# Patient Record
Sex: FEMALE | Race: BLACK OR AFRICAN AMERICAN | Employment: UNEMPLOYED | URBAN - NONMETROPOLITAN AREA
[De-identification: names, ages, dates, MRNs, and addresses within clinical notes are randomized per-mention and may not be internally consistent; named-entity substitution may affect disease eponyms.]

---

## 2023-01-05 ENCOUNTER — OFFICE VISIT (OUTPATIENT)
Dept: INTERNAL MEDICINE CLINIC | Age: 53
End: 2023-01-05

## 2023-01-05 VITALS
OXYGEN SATURATION: 97 % | HEIGHT: 69 IN | BODY MASS INDEX: 36.29 KG/M2 | WEIGHT: 245 LBS | HEART RATE: 92 BPM | SYSTOLIC BLOOD PRESSURE: 136 MMHG | TEMPERATURE: 98.7 F | DIASTOLIC BLOOD PRESSURE: 82 MMHG

## 2023-01-05 DIAGNOSIS — E66.9 OBESITY (BMI 35.0-39.9 WITHOUT COMORBIDITY): ICD-10-CM

## 2023-01-05 DIAGNOSIS — I10 PRIMARY HYPERTENSION: ICD-10-CM

## 2023-01-05 DIAGNOSIS — R73.03 PRE-DIABETES: ICD-10-CM

## 2023-01-05 DIAGNOSIS — Z13.9 SCREENING DUE: ICD-10-CM

## 2023-01-05 DIAGNOSIS — R00.2 PALPITATIONS: Primary | ICD-10-CM

## 2023-01-05 DIAGNOSIS — R05.8 DRY COUGH: ICD-10-CM

## 2023-01-05 LAB — HBA1C MFR BLD: 6 % (ref 4.3–5.7)

## 2023-01-05 PROCEDURE — 3074F SYST BP LT 130 MM HG: CPT

## 2023-01-05 PROCEDURE — 3078F DIAST BP <80 MM HG: CPT

## 2023-01-05 PROCEDURE — 93000 ELECTROCARDIOGRAM COMPLETE: CPT

## 2023-01-05 PROCEDURE — 83036 HEMOGLOBIN GLYCOSYLATED A1C: CPT

## 2023-01-05 PROCEDURE — 99204 OFFICE O/P NEW MOD 45 MIN: CPT

## 2023-01-05 RX ORDER — CANDESARTAN 8 MG/1
8 TABLET ORAL DAILY
COMMUNITY
End: 2023-01-05 | Stop reason: SDUPTHER

## 2023-01-05 RX ORDER — CANDESARTAN 8 MG/1
8 TABLET ORAL DAILY
Qty: 30 TABLET | Refills: 0 | Status: SHIPPED | OUTPATIENT
Start: 2023-01-05 | End: 2023-02-04

## 2023-01-05 RX ORDER — AMLODIPINE BESYLATE 5 MG/1
5 TABLET ORAL DAILY
COMMUNITY
End: 2023-01-05 | Stop reason: SDUPTHER

## 2023-01-05 RX ORDER — AMLODIPINE BESYLATE 5 MG/1
5 TABLET ORAL DAILY
Qty: 30 TABLET | Refills: 0 | Status: SHIPPED | OUTPATIENT
Start: 2023-01-05

## 2023-01-05 RX ORDER — AMIODARONE HYDROCHLORIDE 200 MG/1
200 TABLET ORAL DAILY
Qty: 30 TABLET | Refills: 0 | Status: SHIPPED | OUTPATIENT
Start: 2023-01-05 | End: 2023-02-04

## 2023-01-05 RX ORDER — AMIODARONE HYDROCHLORIDE 200 MG/1
200 TABLET ORAL DAILY
COMMUNITY
End: 2023-01-05 | Stop reason: SDUPTHER

## 2023-01-05 RX ORDER — CLOPIDOGREL BISULFATE 75 MG/1
75 TABLET ORAL DAILY
COMMUNITY
End: 2023-01-05 | Stop reason: CLARIF

## 2023-01-05 SDOH — ECONOMIC STABILITY: FOOD INSECURITY: WITHIN THE PAST 12 MONTHS, YOU WORRIED THAT YOUR FOOD WOULD RUN OUT BEFORE YOU GOT MONEY TO BUY MORE.: SOMETIMES TRUE

## 2023-01-05 SDOH — ECONOMIC STABILITY: FOOD INSECURITY: WITHIN THE PAST 12 MONTHS, THE FOOD YOU BOUGHT JUST DIDN'T LAST AND YOU DIDN'T HAVE MONEY TO GET MORE.: SOMETIMES TRUE

## 2023-01-05 ASSESSMENT — ENCOUNTER SYMPTOMS
CONSTIPATION: 0
CHOKING: 0
ABDOMINAL PAIN: 0
CHEST TIGHTNESS: 0
SORE THROAT: 0
SINUS PAIN: 0
VOMITING: 0
APNEA: 0
COUGH: 1
NAUSEA: 0
DIARRHEA: 0
WHEEZING: 0
SHORTNESS OF BREATH: 0
STRIDOR: 0
VOICE CHANGE: 0
RHINORRHEA: 0
BLOOD IN STOOL: 0

## 2023-01-05 ASSESSMENT — PATIENT HEALTH QUESTIONNAIRE - PHQ9
1. LITTLE INTEREST OR PLEASURE IN DOING THINGS: 0
SUM OF ALL RESPONSES TO PHQ QUESTIONS 1-9: 0
SUM OF ALL RESPONSES TO PHQ9 QUESTIONS 1 & 2: 0
2. FEELING DOWN, DEPRESSED OR HOPELESS: 0
SUM OF ALL RESPONSES TO PHQ QUESTIONS 1-9: 0

## 2023-01-05 ASSESSMENT — SOCIAL DETERMINANTS OF HEALTH (SDOH)
HOW HARD IS IT FOR YOU TO PAY FOR THE VERY BASICS LIKE FOOD, HOUSING, MEDICAL CARE, AND HEATING?: SOMEWHAT HARD
HOW HARD IS IT FOR YOU TO PAY FOR THE VERY BASICS LIKE FOOD, HOUSING, MEDICAL CARE, AND HEATING?: SOMEWHAT HARD

## 2023-01-05 NOTE — ASSESSMENT & PLAN NOTE
Uncontrolled. Likely 2/2 dust in current place of living. Recommend optimizing living area and removing particles of dust. Can recommend HEPA Filter. Will review during next visit.

## 2023-01-05 NOTE — PROGRESS NOTES
[address] [facility] Internal Medicine   [facility] [address] [place] [address], [address] [facility]  Dept: [phone]  Dept Fax: [phone]    Gurvinder Rivera (1970) is a 46 y.o. female New patient, here for evaluation of the following chief complaint(s):  Chief Complaint   Patient presents with    Establish Care    Cough    Palpitations        ASSESSMENT AND PLAN     1. Palpitations  Assessment & Plan:  Uncontrolled. Started 1 year ago. Pt was placed on amiodarone 200 mg daily in addition to plavix 75 mg daily per physicians in [facility]. Unclear if patient has underlying arrythmia. EKG today demonstrated nsr and potential TWI in V1 and V2 but no prior EKG to compare.   -Cardiac event monitor for 14 days  -TTE to evaluate for potential arrhythmia cardiomyopathy or valvular abnormalities  -Continue amiodarone 200 mg daily for now however as this medication has potential for liver and lung disease, will need to consider cessation if a strong indication does not exist  -Discontinue plavix 75 mg daily; unclear indication. No h/o stroke / MI / endovascular procedure / PAD  -Will obtain further lab studies to evaluate for other causes:    +TSH + FT4    +CMP - also to evaluate for liver and renal disease    +CBC w/ auto-diff to evaluate for anemia / thrombocytopenia / leukopenia / etc    +Ionized calcium    +Magnesium    +Phos  Orders:  -     Comprehensive Metabolic Panel; Future  -     EKG 12 Lead - Clinic Performed  -     Cardiac event monitor; Future  -     Magnesium; Future  -     Calcium, Ionized; Future  -     Phosphorus; Future  -     amiodarone (CORDARONE) 200 MG tablet; Take 1 tablet by mouth daily, Disp-30 tablet, R-0Normal  -     Echocardiogram complete; Future  -     TSH; Future  -     T4, Free; Future  2. Pre-diabetes  Assessment & Plan:   Uncontrolled, lifestyle modifications recommended    3. Dry cough  Assessment & Plan:  Uncontrolled. Likely 2/2 dust in current place of living.  Recommend optimizing living area and removing particles of dust. Can recommend HEPA Filter. Will review during next visit. 4. Primary hypertension  Assessment & Plan:   Well-controlled, continue current medications. Takes candesartan 8 mg daily and amlodipine 5 mg daily. /82 mmHg today. Will continue anti-hypertensive regimen for now. Orders:  -     amLODIPine (NORVASC) 5 MG tablet; Take 1 tablet by mouth daily, Disp-30 tablet, R-0Normal  -     candesartan (ATACAND) 8 MG tablet; Take 1 tablet by mouth daily, Disp-30 tablet, R-0Normal  5. Obesity (BMI 35.0-39.9 without comorbidity)  Assessment & Plan:  Uncontrolled, lifestyle modifications recommended   Body mass index is 36.18 kg/m². -Recommend lifestyle changes  -Recommend healthy diet  -Recommend exercise  -Will consider dietician consult  6. Screening due  -     Comprehensive Metabolic Panel; Future  -     CBC with Auto Differential; Future  -     Lipid, Fasting; Future  -     POCT glycosylated hemoglobin (Hb A1C)  -     Magnesium; Future  -     Calcium, Ionized; Future  -     Phosphorus; Future    Return in about 2 weeks (around 2023) for Follow up on TTE / CMP / CBC / lipid panel. with Chavez Gonzalez MD     HPI     Chief Complaint   Patient presents with    Establish Care    Cough    Palpitations     HPI  Pt Maranda Coleman is a 46 y.o. female with no documented past medication history who presents for establishment of care. Denies h/o stroke / MI / any endovascular procedure. Pt reports she is currently monogamous. Denies any other issues. Remainder of ROS below. 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 Denies smoking, occasional social drinking of wine (once a year), and denies illicit substance use.      Family Hx:  -Mom is 80years old - HTN / osteoarthritis  -Dad passed when he was 72years old - he had HTN and  from this issue because he was non-compliant  -2 late brothers - HTN, both, passed away the same way his father   -2 wonderful sisters - no health issues    #Dry Cough: Active  Started 23. Dry cough, no sputum production. Pt reports 1-2 x /month. Pt attributes this to the dust in her new house. #\"Heart Palpitation\": Active  Started 1 year ago, stopped briefly, then started again. Pt was reportedly hospitalized 4 times - pt was asked to change her diet, exercise, eat fruits and vegetables, and stop eating chocolate. Denies h/o MI. Placed in [facility]. Takes amiodarone 200 mg daily for this, pt reports that heart palpitations have improved with this. Also takes plavix 75 mg daily for \"heart palpitations\" per physician in [facility]. Unclear indication. Does have accompanying headaches and occasional dizziness. #Pre-Diabetes A1c 6.0%: Active  Not on any glycemic control agents. Will evaluate further during next visit. #Well-controlled Primary HTN: Stable  BP: 136/82 mmHg. HR: 92 beats/min. Takes amlodipine 5 mg daily at home. Also takes candesartan 8 mg daily. #Obesity Class II (35.0-39.9 kg/m2): Stable  Body mass index is 36.18 kg/m². Plan:  -Recommend lifestyle changes  -Recommend healthy diet  -Recommend exercise  -Will consider inpatient dietician consult    Medications:    Current Outpatient Medications:     amLODIPine (NORVASC) 5 MG tablet, Take 1 tablet by mouth daily, Disp: 30 tablet, Rfl: 0    candesartan (ATACAND) 8 MG tablet, Take 1 tablet by mouth daily, Disp: 30 tablet, Rfl: 0    amiodarone (CORDARONE) 200 MG tablet, Take 1 tablet by mouth daily, Disp: 30 tablet, Rfl: 0    The patient is allergic to dust mite extract. Past Medical History:  Kristi Li  has a past medical history of Hypertension and Palpitations. Past Surgical History:  The patient  has a past surgical history that includes  section. Family History:   This patient's family history includes High Blood Pressure in her brother, father, mother, and sister. Social History:  Devon Larkin  reports that she has never smoked. She has never been exposed to tobacco smoke. She has never used smokeless tobacco. She reports current alcohol use of about 1.0 standard drink per week. She reports that she does not use drugs. Health Maintenance   Topic Date Due    HIV screen  Never done    Hepatitis C screen  Never done    DTaP/Tdap/Td vaccine (1 - Tdap) Never done    Cervical cancer screen  Never done    Lipids  Never done    Colorectal Cancer Screen  Never done    COVID-19 Vaccine (2 - Booster for Jessica series) 06/03/2020    Breast cancer screen  Never done    Shingles vaccine (1 of 2) Never done    Flu vaccine (1) Never done    A1C test (Diabetic or Prediabetic)  01/05/2024    Depression Screen  01/05/2024    Hepatitis A vaccine  Aged Out    Hib vaccine  Aged Out    Meningococcal (ACWY) vaccine  Aged Out    Pneumococcal 0-64 years Vaccine  Aged Out       ROS:  Review of Systems   Constitutional:  Negative for activity change, appetite change, chills, fatigue, fever and unexpected weight change. HENT:  Negative for postnasal drip, rhinorrhea, sinus pain, sore throat, tinnitus and voice change. Eyes:  Negative for visual disturbance. Respiratory:  Positive for cough (dry, started 01/03/23). Negative for apnea, choking, chest tightness, shortness of breath, wheezing and stridor. Cardiovascular:  Positive for palpitations. Negative for chest pain and leg swelling. Gastrointestinal:  Negative for abdominal pain, blood in stool, constipation, diarrhea, nausea and vomiting. Endocrine: Negative for polydipsia and polyuria. Genitourinary:  Negative for dysuria, flank pain and hematuria. Musculoskeletal:  Negative for arthralgias and myalgias. Skin:  Negative for rash and wound. Neurological:  Positive for dizziness (with palpitations) and headaches (with palpitations). Negative for tremors, seizures, weakness, light-headedness and numbness. Hematological:  Negative for adenopathy. Psychiatric/Behavioral:  Positive for confusion. Negative for hallucinations. The patient is not hyperactive. PHYSICAL EXAMINATION     Vitals:    01/05/23 1504   BP: 136/82   Pulse: 92   Temp: 98.7 °F (37.1 °C)   SpO2: 97%       Body mass index is 36.18 kg/m². Physical Exam  Vitals and nursing note reviewed. Constitutional:       General: She is awake. Appearance: Normal appearance. She is obese. She is not ill-appearing or diaphoretic. HENT:      Head: Normocephalic and atraumatic. Right Ear: External ear normal.      Left Ear: External ear normal.      Nose: Nose normal.      Mouth/Throat:      Mouth: Mucous membranes are moist.      Pharynx: Oropharynx is clear. No oropharyngeal exudate or posterior oropharyngeal erythema. Eyes:      General: Lids are normal. No scleral icterus. Extraocular Movements: Extraocular movements intact. Pupils: Pupils are equal, round, and reactive to light. Cardiovascular:      Rate and Rhythm: Normal rate and regular rhythm. Pulses: Normal pulses. Radial pulses are 2+ on the right side and 2+ on the left side. Posterior tibial pulses are 2+ on the right side and 2+ on the left side. Heart sounds: Normal heart sounds. No murmur heard. No friction rub. No gallop. Pulmonary:      Effort: Pulmonary effort is normal.      Breath sounds: Normal breath sounds. No wheezing, rhonchi or rales. Abdominal:      General: Bowel sounds are normal.      Palpations: Abdomen is soft. Tenderness: There is no abdominal tenderness. There is no guarding or rebound. Musculoskeletal:      Cervical back: Normal range of motion and neck supple. Right lower leg: No edema. Left lower leg: No edema. Skin:     General: Skin is warm and dry. Neurological:      General: No focal deficit present. Mental Status: She is alert and oriented to person, place, and time.  Mental status is at baseline. GCS: GCS eye subscore is 4. GCS verbal subscore is 5. GCS motor subscore is 6. Psychiatric:         Attention and Perception: Attention and perception normal.         Mood and Affect: Mood and affect normal.         Speech: Speech normal.         Behavior: Behavior normal. Behavior is cooperative. Thought Content: Thought content normal.         Cognition and Memory: Cognition and memory normal.         Judgment: Judgment normal.       MOST RECENT DIAGNOSTIC DATA     Labs Reviewed 1/5/2023:    Lab Results   Component Value Date    LABA1C 6.0 (H) 01/05/2023     Radiology  none    Other Studies  A1c: 6.0% 01/05/22  EKG: nsr. Assessment and plan created with review by Dr. Adrien Tobar. Farooq Worthington DO. All findings, labs and other data reviewed in conjunction with Dr. Adrien Tobar. Farooq Worthington DO. An electronic signature was used to authenticate this note.     Electronically signed by Yossi Wren MD on 1/5/2023 at 5:24 PM  Internal Medicine PGY-2  [facility], [address]

## 2023-01-05 NOTE — ASSESSMENT & PLAN NOTE
Uncontrolled, lifestyle modifications recommended   Body mass index is 36.18 kg/m².   -Recommend lifestyle changes  -Recommend healthy diet  -Recommend exercise  -Will consider dietician consult

## 2023-01-05 NOTE — ASSESSMENT & PLAN NOTE
Uncontrolled. Started 1 year ago. Pt was placed on amiodarone 200 mg daily in addition to plavix 75 mg daily per physicians in [facility]. Unclear if patient has underlying arrythmia. EKG today demonstrated nsr and potential TWI in V1 and V2 but no prior EKG to compare.   -Cardiac event monitor for 14 days  -TTE to evaluate for potential arrhythmia cardiomyopathy or valvular abnormalities  -Continue amiodarone 200 mg daily for now however as this medication has potential for liver and lung disease, will need to consider cessation if a strong indication does not exist  -Discontinue plavix 75 mg daily; unclear indication.  No h/o stroke / MI / endovascular procedure / PAD  -Will obtain further lab studies to evaluate for other causes:    +TSH + FT4    +CMP - also to evaluate for liver and renal disease    +CBC w/ auto-diff to evaluate for anemia / thrombocytopenia / leukopenia / etc    +Ionized calcium    +Magnesium    +Phos

## 2023-01-05 NOTE — ASSESSMENT & PLAN NOTE
Well-controlled, continue current medications. Takes candesartan 8 mg daily and amlodipine 5 mg daily. /82 mmHg today. Will continue anti-hypertensive regimen for now.

## 2023-01-09 ENCOUNTER — HOSPITAL ENCOUNTER (OUTPATIENT)
Age: 53
Discharge: HOME | End: 2023-01-09

## 2023-01-09 DIAGNOSIS — R00.2 PALPITATIONS: ICD-10-CM

## 2023-01-09 DIAGNOSIS — Z13.9 SCREENING DUE: ICD-10-CM

## 2023-01-09 LAB
ALBUMIN SERPL-MCNC: 4.1 G/DL (ref 3.5–5.1)
ALP BLD-CCNC: 91 U/L (ref 38–126)
ALT SERPL-CCNC: 19 U/L (ref 11–66)
ANION GAP SERPL CALCULATED.3IONS-SCNC: 11 MEQ/L (ref 8–16)
AST SERPL-CCNC: 20 U/L (ref 5–40)
AVERAGE GLUCOSE: 132 MG/DL (ref 70–126)
BASOPHILS # BLD: 0.5 %
BASOPHILS ABSOLUTE: 0 THOU/MM3 (ref 0–0.1)
BILIRUB SERPL-MCNC: 0.2 MG/DL (ref 0.3–1.2)
BUN BLDV-MCNC: 10 MG/DL (ref 7–22)
CALCIUM IONIZED: 1.17 MMOL/L (ref 1.12–1.32)
CALCIUM SERPL-MCNC: 9.4 MG/DL (ref 8.5–10.5)
CHLORIDE BLD-SCNC: 104 MEQ/L (ref 98–111)
CHOLESTEROL, FASTING: 285 MG/DL (ref 100–199)
CO2: 27 MEQ/L (ref 23–33)
CREAT SERPL-MCNC: 0.8 MG/DL (ref 0.4–1.2)
EOSINOPHIL # BLD: 2.7 %
EOSINOPHILS ABSOLUTE: 0.2 THOU/MM3 (ref 0–0.4)
ERYTHROCYTE [DISTWIDTH] IN BLOOD BY AUTOMATED COUNT: 15.4 % (ref 11.5–14.5)
ERYTHROCYTE [DISTWIDTH] IN BLOOD BY AUTOMATED COUNT: 47.1 FL (ref 35–45)
GFR SERPL CREATININE-BSD FRML MDRD: > 60 ML/MIN/1.73M2
GLUCOSE BLD-MCNC: 89 MG/DL (ref 70–108)
HBA1C MFR BLD: 6.4 % (ref 4.4–6.4)
HCT VFR BLD CALC: 40.1 % (ref 37–47)
HDLC SERPL-MCNC: 42 MG/DL
HEMOGLOBIN: 13 GM/DL (ref 12–16)
IMMATURE GRANS (ABS): 0.01 THOU/MM3 (ref 0–0.07)
IMMATURE GRANULOCYTES: 0.2 %
LDL CHOLESTEROL CALCULATED: 191 MG/DL
LYMPHOCYTES # BLD: 56.6 %
LYMPHOCYTES ABSOLUTE: 3.5 THOU/MM3 (ref 1–4.8)
MAGNESIUM: 2 MG/DL (ref 1.6–2.4)
MCH RBC QN AUTO: 27.5 PG (ref 26–33)
MCHC RBC AUTO-ENTMCNC: 32.4 GM/DL (ref 32.2–35.5)
MCV RBC AUTO: 84.8 FL (ref 81–99)
MONOCYTES # BLD: 8.8 %
MONOCYTES ABSOLUTE: 0.5 THOU/MM3 (ref 0.4–1.3)
NUCLEATED RED BLOOD CELLS: 0 /100 WBC
PHOSPHORUS: 3.3 MG/DL (ref 2.4–4.7)
PLATELET # BLD: 342 THOU/MM3 (ref 130–400)
PMV BLD AUTO: 8.9 FL (ref 9.4–12.4)
POTASSIUM SERPL-SCNC: 4.2 MEQ/L (ref 3.5–5.2)
RBC # BLD: 4.73 MILL/MM3 (ref 4.2–5.4)
SEG NEUTROPHILS: 31.2 %
SEGMENTED NEUTROPHILS ABSOLUTE COUNT: 1.9 THOU/MM3 (ref 1.8–7.7)
SODIUM BLD-SCNC: 142 MEQ/L (ref 135–145)
TOTAL PROTEIN: 7.3 G/DL (ref 6.1–8)
TRIGLYCERIDE, FASTING: 260 MG/DL (ref 0–199)
WBC # BLD: 6.2 THOU/MM3 (ref 4.8–10.8)

## 2023-01-09 PROCEDURE — 36415 COLL VENOUS BLD VENIPUNCTURE: CPT

## 2023-01-09 PROCEDURE — 85025 COMPLETE CBC W/AUTO DIFF WBC: CPT

## 2023-01-09 PROCEDURE — 84100 ASSAY OF PHOSPHORUS: CPT

## 2023-01-09 PROCEDURE — 82330 ASSAY OF CALCIUM: CPT

## 2023-01-09 PROCEDURE — 83735 ASSAY OF MAGNESIUM: CPT

## 2023-01-09 PROCEDURE — 83036 HEMOGLOBIN GLYCOSYLATED A1C: CPT

## 2023-01-09 PROCEDURE — 80053 COMPREHEN METABOLIC PANEL: CPT

## 2023-01-09 PROCEDURE — 80061 LIPID PANEL: CPT

## 2023-01-16 ENCOUNTER — HOSPITAL ENCOUNTER (OUTPATIENT)
Dept: NON INVASIVE DIAGNOSTICS | Age: 53
Discharge: HOME | End: 2023-01-16
Payer: COMMERCIAL

## 2023-01-16 DIAGNOSIS — R00.2 PALPITATIONS: ICD-10-CM

## 2023-01-16 LAB
LV EF: 45 %
LVEF MODALITY: NORMAL

## 2023-01-16 PROCEDURE — 93270 REMOTE 30 DAY ECG REV/REPORT: CPT

## 2023-01-16 PROCEDURE — 93306 TTE W/DOPPLER COMPLETE: CPT

## 2023-01-16 NOTE — PROCEDURES
The skin was prepped and a 14 day cardiac event monitor was applied. The patient was instructed on the documentation of symptoms and the purpose of the monitor as well as the things to avoid while wearing the monitor. The patient was instructed to remove and return the monitor on 01/30/2023.   The serial number of the monitor that was applied is [id number]

## 2023-01-18 PROBLEM — I50.22 HEART FAILURE WITH MID-RANGE EJECTION FRACTION (HCC): Status: ACTIVE | Noted: 2023-01-18

## 2023-01-18 PROBLEM — E78.2 MIXED DYSLIPIDEMIA: Status: ACTIVE | Noted: 2023-01-18

## 2023-01-18 PROBLEM — I50.20 HFREF (HEART FAILURE WITH REDUCED EJECTION FRACTION) (HCC): Status: ACTIVE | Noted: 2023-01-18

## 2023-01-18 PROBLEM — I50.20 HFREF (HEART FAILURE WITH REDUCED EJECTION FRACTION) (HCC): Status: RESOLVED | Noted: 2023-01-18 | Resolved: 2023-01-18

## 2023-01-19 PROBLEM — Z00.00 ROUTINE ADULT HEALTH MAINTENANCE: Status: ACTIVE | Noted: 2023-01-19

## 2023-01-24 ENCOUNTER — TELEPHONE (OUTPATIENT)
Dept: INTERNAL MEDICINE CLINIC | Age: 53
End: 2023-01-24

## 2023-01-24 NOTE — TELEPHONE ENCOUNTER
----- Message from Concepcion Conway sent at 1/24/2023 10:53 AM EST -----  Subject: Appointment Request    Reason for Call: Established Patient Appointment needed: Routine Existing   Condition Follow Up    QUESTIONS    Reason for appointment request? Requested Provider unavailable - Mychal Hawkins     Additional Information for Provider? Patient needs to r/s her appt from   1/19 for her 2 week f/u for heart.  She would prefer mornings if at all   possible.   ---------------------------------------------------------------------------  --------------  Trudy RODAS  [phone]; OK to leave message on voicemail  ---------------------------------------------------------------------------  --------------  SCRIPT ANSWERS  COVID Screen: Seb Wolf

## 2023-02-09 ENCOUNTER — OFFICE VISIT (OUTPATIENT)
Dept: INTERNAL MEDICINE CLINIC | Age: 53
End: 2023-02-09

## 2023-02-09 VITALS
SYSTOLIC BLOOD PRESSURE: 130 MMHG | HEIGHT: 69 IN | HEART RATE: 84 BPM | DIASTOLIC BLOOD PRESSURE: 80 MMHG | TEMPERATURE: 97.2 F | BODY MASS INDEX: 35.87 KG/M2 | WEIGHT: 242.2 LBS

## 2023-02-09 DIAGNOSIS — E78.2 MIXED DYSLIPIDEMIA: ICD-10-CM

## 2023-02-09 DIAGNOSIS — I50.22 HEART FAILURE WITH MID-RANGE EJECTION FRACTION (HCC): ICD-10-CM

## 2023-02-09 DIAGNOSIS — Z00.00 ROUTINE ADULT HEALTH MAINTENANCE: ICD-10-CM

## 2023-02-09 DIAGNOSIS — I10 PRIMARY HYPERTENSION: ICD-10-CM

## 2023-02-09 DIAGNOSIS — E66.9 OBESITY (BMI 35.0-39.9 WITHOUT COMORBIDITY): ICD-10-CM

## 2023-02-09 DIAGNOSIS — R00.2 PALPITATIONS: Primary | ICD-10-CM

## 2023-02-09 DIAGNOSIS — R73.03 PRE-DIABETES: ICD-10-CM

## 2023-02-09 PROCEDURE — 3074F SYST BP LT 130 MM HG: CPT

## 2023-02-09 PROCEDURE — 99214 OFFICE O/P EST MOD 30 MIN: CPT

## 2023-02-09 PROCEDURE — 3078F DIAST BP <80 MM HG: CPT

## 2023-02-09 RX ORDER — AMIODARONE HYDROCHLORIDE 200 MG/1
200 TABLET ORAL DAILY
Qty: 30 TABLET | Refills: 3 | Status: SHIPPED | OUTPATIENT
Start: 2023-02-09

## 2023-02-09 RX ORDER — AMIODARONE HYDROCHLORIDE 200 MG/1
200 TABLET ORAL DAILY
COMMUNITY
End: 2023-02-09 | Stop reason: SDUPTHER

## 2023-02-09 RX ORDER — SPIRONOLACTONE 50 MG/1
50 TABLET, FILM COATED ORAL DAILY
Qty: 90 TABLET | Refills: 1 | Status: SHIPPED | OUTPATIENT
Start: 2023-02-09

## 2023-02-09 RX ORDER — ATORVASTATIN CALCIUM 40 MG/1
40 TABLET, FILM COATED ORAL DAILY
Qty: 30 TABLET | Refills: 0 | Status: SHIPPED | OUTPATIENT
Start: 2023-02-09

## 2023-02-09 RX ORDER — AMLODIPINE BESYLATE 5 MG/1
5 TABLET ORAL DAILY
Qty: 30 TABLET | Refills: 3 | Status: CANCELLED | OUTPATIENT
Start: 2023-02-09

## 2023-02-09 RX ORDER — ASPIRIN 81 MG/1
81 TABLET ORAL DAILY
Qty: 90 TABLET | Refills: 1 | Status: SHIPPED | OUTPATIENT
Start: 2023-02-09

## 2023-02-09 RX ORDER — CANDESARTAN 8 MG/1
8 TABLET ORAL DAILY
Qty: 30 TABLET | Refills: 3 | Status: SHIPPED | OUTPATIENT
Start: 2023-02-09

## 2023-02-09 RX ORDER — CANDESARTAN 8 MG/1
8 TABLET ORAL DAILY
COMMUNITY
End: 2023-02-09 | Stop reason: SDUPTHER

## 2023-02-09 RX ORDER — METOPROLOL SUCCINATE 25 MG/1
25 TABLET, EXTENDED RELEASE ORAL DAILY
Qty: 90 TABLET | Refills: 1 | Status: SHIPPED | OUTPATIENT
Start: 2023-02-09

## 2023-02-09 SDOH — ECONOMIC STABILITY: HOUSING INSECURITY
IN THE LAST 12 MONTHS, WAS THERE A TIME WHEN YOU DID NOT HAVE A STEADY PLACE TO SLEEP OR SLEPT IN A SHELTER (INCLUDING NOW)?: NO

## 2023-02-09 SDOH — ECONOMIC STABILITY: FOOD INSECURITY: WITHIN THE PAST 12 MONTHS, YOU WORRIED THAT YOUR FOOD WOULD RUN OUT BEFORE YOU GOT MONEY TO BUY MORE.: OFTEN TRUE

## 2023-02-09 SDOH — ECONOMIC STABILITY: INCOME INSECURITY: HOW HARD IS IT FOR YOU TO PAY FOR THE VERY BASICS LIKE FOOD, HOUSING, MEDICAL CARE, AND HEATING?: HARD

## 2023-02-09 SDOH — ECONOMIC STABILITY: FOOD INSECURITY: WITHIN THE PAST 12 MONTHS, THE FOOD YOU BOUGHT JUST DIDN'T LAST AND YOU DIDN'T HAVE MONEY TO GET MORE.: OFTEN TRUE

## 2023-02-09 NOTE — PROGRESS NOTES
[address] [facility] Internal Medicine   [facility] [address] [facility] [address] JESUS SMITH AM OFFENEGG II.EHSAN, [address] [place] [facility]  Dept: [phone]  Dept Fax: [phone]    Roge Starks (1970) is a 46 y.o. female Established patient, here for evaluation of the following chief complaint(s):  Chief Complaint   Patient presents with    Hypertension     1 month / f/u echo , holter & labs     Palpitations    Obesity    Other     Prediabetes- A1c 6.4 on 19/23        ASSESSMENT AND PLAN     1. Palpitations  Assessment & Plan:  Uncontrolled. Unclear etiology. Intermittent, sporadic, but persistently so for 1 year. Denies h/o MI or arrhythmias. Saw cardiologist in [facility] who started her on amiodarone after which palpitations improved. Unclear indication for this. Does have accompanied headaches and lightheadedness with palpitations. Workup thus far:  -EKG 01/05/23 demonstrated nsr and potential TWI in V1 and V2 but no prior EKG to compare. Also has LVH  -Cardiac event monitor for 14 days placed 01/16/23 and returned; read pending  -TTE 01/16/23 with LVH and EF 45%  -Continue amiodarone 200 mg daily for now however as this medication has potential for liver and lung disease, will need to consider cessation if a strong indication does not exist  -CBC / Lizy Fairly / ioCal / Mg / Phos 01/09/23 wnl   -TSH / FT4 pending  Plan:  -Referral placed to Dr. Car Fontaine MD, Cardiology for further evaluation and management.   -Asked patient to obtain TSH / FT4 prior to visit with him. Orders:  -     amiodarone (CORDARONE) 200 MG tablet; Take 1 tablet by mouth daily, Disp-30 tablet, R-3Normal  -     metoprolol succinate (TOPROL XL) 25 MG extended release tablet; Take 1 tablet by mouth daily, Disp-90 tablet, R-1Normal  -     Sandip Carver MD, Cardiology, [address] [facility] Metabolic Panel; Future  2. Heart failure with mid-range ejection fraction, NYHA I ([address]  Assessment & Plan:  Uncontrolled.  Discovered on TTE initially obtained for \"heart palpitations\" with accompanied HA and occasional dizziness. 01/16/23 EF 45% with mild global hypokinesis of LV. Trivial TVR + PVR. Pt has no s/s of L or R heart failure on exam. At this time, unclear whether this is NICM or ICM. Pt has RFs for both. Plan:   -Refer to cardiology as above  -Will need optimization of GDMT; pt currently doesn't have insurance    +Start metoprolol Succinate 25 mg daily    +Start spironolactone 50 mg daily    +Continue candesartan 8 mg daily; will consider switching to valsartan in anticipation of adding entresto when pt gets insurance    +Consider initiation of Empagliflozin 10 mg daily when patient gets insurance. +Can continue candesartan for now; can consider switching to valsartan in anticipation of using entresto    +D/C amlodipine to up-titrate aforementioned GDMT medications    +Repeat BMP in 1 week  Orders:  -     candesartan (ATACAND) 8 MG tablet; Take 1 tablet by mouth daily, Disp-30 tablet, R-3Normal  -     metoprolol succinate (TOPROL XL) 25 MG extended release tablet; Take 1 tablet by mouth daily, Disp-90 tablet, R-1Normal  -     spironolactone (ALDACTONE) 50 MG tablet; Take 1 tablet by mouth daily, Disp-90 tablet, R-1Normal  -     Leonard [place] MD, Cardiology, [address] [facility] Metabolic Panel; Future  3. Primary hypertension  Assessment & Plan:  Well-controlled. TTE with concentric LVH c/f long-standing h/o HTN. Medications as above. Orders:  -     candesartan (ATACAND) 8 MG tablet; Take 1 tablet by mouth daily, Disp-30 tablet, R-3Normal  -     Leonard [place] MD, Cardiology, [address] [facility] Metabolic Panel; Future  4. Pre-diabetes  Assessment & Plan:  Uncontrolled. HgbA1c 6.0% 01/05/23. Plan:  -Continue to recommend lifestyle changes    +Diet: recommended referral to dietician but pt would like to try to manage her diet by herself; provided information on diet management.     +Exercise: recommend >150 min/wk of moderate intensity exercise  -Can consider addition of Empagliflozin for heart failure mortality modification as well as pre-diabetes once patient gets insurance. 5. Mixed dyslipidemia  Assessment & Plan:  Uncontrolled. Last fasting lipid panel 01/09/23: Tchol 285 mg/dL,  mg/dL, HDL 42 mg/dL,  mg/dL. 10-year ASCVD risk is 8.0% which reduces to 1.3% with optimization of risk factors. Lifetime ASCVD risk is 50% which reduces to 8.0% with optimization of risk factors. Plan:  -Initiate atorvastatin  -Recommend low fat diet    +15-20% dietary fat restriction okay    +5-10% dietary fat restriction ideal  -Repeat fasting lipid panel in 3 months. Orders:  -     atorvastatin (LIPITOR) 40 MG tablet; Take 1 tablet by mouth daily, Disp-30 tablet, R-0Normal  -     aspirin EC 81 MG EC tablet; Take 1 tablet by mouth daily, Disp-90 tablet, R-1Normal  -     Lipid Panel; Future  6. Obesity (BMI 35.0-39.9 without comorbidity)  Assessment & Plan: Body mass index is 35.77 kg/m². Suspected Obstructive Sleep Apnea as patient's STOP-BANG is positive.  denies apneic events but does endorse severe snoring. Plan:  -Recommend lifestyle changes  -Recommend healthy diet  -Recommend exercise  -Discussed dietician consult; will defer for now  7. Routine adult health maintenance  Assessment & Plan:  Prioritized the aforementioned problems for now. Will discuss screening during next visit. Pt currently does not have insurance and is working on getting on her [employer]. Pneumococcal Vaccine: Will discuss during next visit  HIV screen: Will discuss during next visit  Hepatitis C screen: Will discuss during next visit  Tetanus Vaccine: Will discuss during next visit  Cervical Cancer Screening: Will discuss during next visit  COVID-19 Vaccine: Will discuss during next visit  Breast Cancer Screening: Will discuss during next visit  Colorectal Cancer Screening: Will discuss during next visit  Shingles Vaccine:  Will discuss during next visit    Return in about 1 month (around 3/9/2023) for GDMT optimization, screening test, BMP / Thyroid studies check, etc.. with Georgine Bence, MD     [facility]     Chief Complaint   Patient presents with    Hypertension     1 month / f/u echo , holter & labs     Palpitations    Obesity    Other     Prediabetes- A1c 6.4 on      HPI  Pt Roge Starks is a 46 y.o. female with no documented past medication history who presents for establishment of care. Denies h/o stroke / MI / any endovascular procedure. Pt reports she is currently monogamous. Denies any other issues. Remainder of ROS below. Social Hx: in 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 Denies smoking, occasional social drinking of wine (once a year), and denies illicit substance use. Family Hx:  -Mom is 80years old - HTN / osteoarthritis  -Dad passed when he was 72years old - he had HTN and  from this issue because he was non-compliant  -2 late brothers - HTN, both, passed away the same way his father   -2 wonderful sisters - no health issues    #\"Heart Palpitation\": Active  Started 1 year ago, stopped briefly, then started again. Pt was reportedly hospitalized 4 times - pt was asked to change her diet, exercise, eat fruits and vegetables, and stop eating chocolate. Denies h/o MI. [facility] Takes amiodarone 200 mg daily for this, pt reports that heart palpitations have improved with this. Also takes plavix 75 mg daily for \"heart palpitations\" per physician in [facility]. Unclear indication for any of these medications. Does have accompanying headaches and occasional dizziness. EKG 23 with TWI in V1 and V2 but no prior EKG for comparison, also LVH. 14-day cardiac event monitor placed 23, results pending. TSH and FT4 ordered but not obtained. CMP / Mg / Phos wnl.      # Subacute vs Chronic  HFmrEF with EF 45% on  23 , NYHA Class I 2/2 ICM vs NICM: Active  Diagnosed in  01/16/23 . Does note have dyspnea on exertion, orthopnea, paroxysmal nocturnal dyspnea, cardiac asthma, lower extremity edema bilaterally, hepatic venous congestion, or congestive gastropathy. Does not have bilateral basilar rales on auscultation, laterally displaced apical heart beat, coolness and pallor of lower extremities, jugular venous distension, kussmaul sign, hepatosplenomegaly, or hepatojugular reflex. Pro-BNP not obtained. CXR not obtained. GDMT at home: candesartan. Diuretics at home: none. Follows in HF Clinic: No.     #Well-controlled Primary HTN: Active  BP: 130/80 mmHg. HR: 84 beats/min. LVH on TTE 01/16/23. On candesartan and amlodipine outpatient. #Mixed Dyslipidemia: Active  Last fasting lipid panel 01/09/23: Tchol 285 mg/dL,  mg/dL, HDL 42 mg/dL,  mg/dL. Takes nothing at home. 10-year ASCVD risk is 8.0% which reduces to 1.3% with optimization of risk factors. Lifetime ASCVD risk is 50% which reduces to 8.0% with optimization of risk factors. #Pre-Diabetes A1c 6.0% on  01/05/23 : Active  Not on pharmacotherapy. High risk for progression to T2DM. #Obesity Class II (35.0-39.9 kg/m2): Active  Body mass index is 35.77 kg/m². STOP-BANG previously positive.  continues to deny apneic events but does endorse severe snoring. #Health Maintenance: Stable  Prioritized the aforementioned problems for now. Will discuss screening during next visit. Pt currently does not have insurance and is working on getting on her [employer]. Pneumococcal Vaccine: Will discuss during next visit  HIV screen: Will discuss during next visit  Hepatitis C screen: Will discuss during next visit  Tetanus Vaccine: Will discuss during next visit  Cervical Cancer Screening: Will discuss during next visit  COVID-19 Vaccine: Will discuss during next visit  Breast Cancer Screening:  Will discuss during next visit  Colorectal Cancer Screening: Will discuss during next visit  Shingles Vaccine: Will discuss during next visit    Medications:    Current Outpatient Medications:     amiodarone (CORDARONE) 200 MG tablet, Take 1 tablet by mouth daily, Disp: 30 tablet, Rfl: 3    candesartan (ATACAND) 8 MG tablet, Take 1 tablet by mouth daily, Disp: 30 tablet, Rfl: 3    atorvastatin (LIPITOR) 40 MG tablet, Take 1 tablet by mouth daily, Disp: 30 tablet, Rfl: 0    aspirin EC 81 MG EC tablet, Take 1 tablet by mouth daily, Disp: 90 tablet, Rfl: 1    metoprolol succinate (TOPROL XL) 25 MG extended release tablet, Take 1 tablet by mouth daily, Disp: 90 tablet, Rfl: 1    spironolactone (ALDACTONE) 50 MG tablet, Take 1 tablet by mouth daily, Disp: 90 tablet, Rfl: 1    The patient is allergic to dust mite extract. Past Medical History:  Kait Louise  has a past medical history of Heart failure with reduced ejection fraction, NYHA class I ([address] Hypertension, and Palpitations. Past Surgical History:  The patient  has a past surgical history that includes  section. Family History: This patient's family history includes High Blood Pressure in her brother, father, mother, and sister. Social History:  Kait Louise  reports that she has never smoked. She has never been exposed to tobacco smoke. She has never used smokeless tobacco. She reports that she does not currently use alcohol after a past usage of about 1.0 standard drink per week. She reports that she does not use drugs.      Health Maintenance   Topic Date Due    Pneumococcal 0-64 years Vaccine (1 - PCV) Never done    HIV screen  Never done    Hepatitis C screen  Never done    DTaP/Tdap/Td vaccine (1 - Tdap) Never done    Cervical cancer screen  Never done    Colorectal Cancer Screen  Never done    COVID-19 Vaccine (2 - Booster for Jessica series) 2020    Breast cancer screen  Never done    Shingles vaccine (1 of 2) Never done    Flu vaccine (1) Never done    Depression Screen  2024    A1C test (Diabetic or Prediabetic)  01/09/2024    Lipids  01/09/2024    Hepatitis A vaccine  Aged Out    Hib vaccine  Aged Out    Meningococcal (ACWY) vaccine  Aged Out       ROS:  Review of Systems - negative except for the aforementioned. PHYSICAL EXAMINATION     Vitals:    02/09/23 1511   BP: 130/80   Pulse: 84   Temp: 97.2 °F (36.2 °C)       Body mass index is 35.77 kg/m². Physical Exam  Vitals and nursing note reviewed. Constitutional:       General: She is awake. Appearance: Normal appearance. She is obese. She is not ill-appearing or diaphoretic. HENT:      Head: Normocephalic and atraumatic. Right Ear: External ear normal.      Left Ear: External ear normal.      Nose: Nose normal.      Mouth/Throat:      Mouth: Mucous membranes are moist.      Pharynx: Oropharynx is clear. No oropharyngeal exudate or posterior oropharyngeal erythema. Eyes:      General: Lids are normal. No scleral icterus. Extraocular Movements: Extraocular movements intact. Pupils: Pupils are equal, round, and reactive to light. Cardiovascular:      Rate and Rhythm: Normal rate and regular rhythm. Pulses: Normal pulses. Radial pulses are 2+ on the right side and 2+ on the left side. Posterior tibial pulses are 2+ on the right side and 2+ on the left side. Heart sounds: Normal heart sounds. No murmur heard. No friction rub. No gallop. Pulmonary:      Effort: Pulmonary effort is normal.      Breath sounds: Normal breath sounds. No wheezing, rhonchi or rales. Abdominal:      General: Bowel sounds are normal.      Palpations: Abdomen is soft. Tenderness: There is no abdominal tenderness. There is no guarding or rebound. Musculoskeletal:      Cervical back: Normal range of motion and neck supple. Right lower leg: No edema. Left lower leg: No edema. Skin:     General: Skin is warm and dry. Neurological:      General: No focal deficit present.       Mental Status: She is alert and oriented to person, place, and time. Mental status is at baseline. GCS: GCS eye subscore is 4. GCS verbal subscore is 5. GCS motor subscore is 6. Psychiatric:         Attention and Perception: Attention and perception normal.         Mood and Affect: Mood and affect normal.         Speech: Speech normal.         Behavior: Behavior normal. Behavior is cooperative. Thought Content: Thought content normal.         Cognition and Memory: Cognition and memory normal.         Judgment: Judgment normal.       MOST RECENT DIAGNOSTIC DATA     Labs Reviewed 2/9/2023:    Lab Results   Component Value Date    WBC 6.2 01/09/2023    HGB 13.0 01/09/2023    HCT 40.1 01/09/2023     01/09/2023    HDL 42 01/09/2023    ALT 19 01/09/2023    AST 20 01/09/2023     01/09/2023    K 4.2 01/09/2023     01/09/2023    CREATININE 0.8 01/09/2023    BUN 10 01/09/2023    CO2 27 01/09/2023    LABA1C 6.4 01/09/2023       Radiology  As above    Other Studies  As above    Assessment and plan created with review by Dr. Wilma Valderrama. Adrien Rodriguez MD.  All findings, labs and other data reviewed in conjunction with Dr. Wilma Valderrama. Adrien Rodriguez MD.    An electronic signature was used to authenticate this note.     Electronically signed by Lexy Granados MD on 2/9/2023 at 5:18 PM  Internal Medicine PGY-2  [facility] LUIS LINCOLN II.EHSAN, [address]

## 2023-02-09 NOTE — ASSESSMENT & PLAN NOTE
Uncontrolled. Last fasting lipid panel 01/09/23: Tchol 285 mg/dL,  mg/dL, HDL 42 mg/dL,  mg/dL. 10-year ASCVD risk is 8.0% which reduces to 1.3% with optimization of risk factors. Lifetime ASCVD risk is 50% which reduces to 8.0% with optimization of risk factors. Plan:  -Initiate atorvastatin  -Recommend low fat diet    +15-20% dietary fat restriction okay    +5-10% dietary fat restriction ideal  -Repeat fasting lipid panel in 3 months.

## 2023-02-09 NOTE — ASSESSMENT & PLAN NOTE
Prioritized the aforementioned problems for now. Will discuss screening during next visit. Pt currently does not have insurance and is working on getting on her [employer]. Pneumococcal Vaccine: Will discuss during next visit  HIV screen: Will discuss during next visit  Hepatitis C screen: Will discuss during next visit  Tetanus Vaccine: Will discuss during next visit  Cervical Cancer Screening: Will discuss during next visit  COVID-19 Vaccine: Will discuss during next visit  Breast Cancer Screening: Will discuss during next visit  Colorectal Cancer Screening: Will discuss during next visit  Shingles Vaccine:  Will discuss during next visit

## 2023-02-09 NOTE — ASSESSMENT & PLAN NOTE
Body mass index is 35.77 kg/m². Suspected Obstructive Sleep Apnea as patient's STOP-BANG is positive.  denies apneic events but does endorse severe snoring.    Plan:  -Recommend lifestyle changes  -Recommend healthy diet  -Recommend exercise  -Discussed dietician consult; will defer for now

## 2023-02-09 NOTE — ASSESSMENT & PLAN NOTE
Uncontrolled. HgbA1c 6.0% 01/05/23. Plan:  -Continue to recommend lifestyle changes    +Diet: recommended referral to dietician but pt would like to try to manage her diet by herself; provided information on diet management. +Exercise: recommend >150 min/wk of moderate intensity exercise  -Can consider addition of Empagliflozin for heart failure mortality modification as well as pre-diabetes once patient gets insurance.

## 2023-02-09 NOTE — ASSESSMENT & PLAN NOTE
Uncontrolled. Discovered on TTE initially obtained for \"heart palpitations\" with accompanied HA and occasional dizziness. 01/16/23 EF 45% with mild global hypokinesis of LV. Trivial TVR + PVR. Pt has no s/s of L or R heart failure on exam. At this time, unclear whether this is NICM or ICM. Pt has RFs for both. Plan:   -Refer to cardiology as above  -Will need optimization of GDMT; pt currently doesn't have insurance    +Start metoprolol Succinate 25 mg daily    +Start spironolactone 50 mg daily    +Continue candesartan 8 mg daily; will consider switching to valsartan in anticipation of adding entresto when pt gets insurance    +Consider initiation of Empagliflozin 10 mg daily when patient gets insurance.      +Can continue candesartan for now; can consider switching to valsartan in anticipation of using entresto    +D/C amlodipine to up-titrate aforementioned GDMT medications    +Repeat BMP in 1 week

## 2023-02-09 NOTE — ASSESSMENT & PLAN NOTE
Uncontrolled. Unclear etiology. Intermittent, sporadic, but persistently so for 1 year. Denies h/o MI or arrhythmias. Saw cardiologist in [facility] who started her on amiodarone after which palpitations improved. Unclear indication for this. Does have accompanied headaches and lightheadedness with palpitations. Workup thus far:  -EKG 01/05/23 demonstrated nsr and potential TWI in V1 and V2 but no prior EKG to compare. Also has LVH  -Cardiac event monitor for 14 days placed 01/16/23 and returned; read pending  -TTE 01/16/23 with LVH and EF 45%  -Continue amiodarone 200 mg daily for now however as this medication has potential for liver and lung disease, will need to consider cessation if a strong indication does not exist  -CBC / Reina Elizabeth / ioCal / Mg / Phos 01/09/23 wnl   -TSH / FT4 pending  Plan:  -Referral placed to Dr. Nicholas Rincon MD, Cardiology for further evaluation and management.   -Asked patient to obtain TSH / FT4 prior to visit with him.

## 2023-02-13 ENCOUNTER — TELEPHONE (OUTPATIENT)
Dept: CARDIOLOGY CLINIC | Age: 53
End: 2023-02-13

## 2023-02-15 NOTE — TELEPHONE ENCOUNTER
Left another message on machine for patient to call back if they would like to r/s their missed new patient appt

## 2023-02-18 PROBLEM — Z00.00 ROUTINE ADULT HEALTH MAINTENANCE: Status: RESOLVED | Noted: 2023-01-19 | Resolved: 2023-02-18

## 2023-02-20 ENCOUNTER — TELEPHONE (OUTPATIENT)
Dept: INTERNAL MEDICINE CLINIC | Age: 53
End: 2023-02-20

## 2023-03-09 ENCOUNTER — OFFICE VISIT (OUTPATIENT)
Dept: INTERNAL MEDICINE CLINIC | Age: 53
End: 2023-03-09

## 2023-03-09 VITALS
HEIGHT: 69 IN | HEART RATE: 68 BPM | BODY MASS INDEX: 35.22 KG/M2 | WEIGHT: 237.8 LBS | TEMPERATURE: 97.4 F | SYSTOLIC BLOOD PRESSURE: 122 MMHG | DIASTOLIC BLOOD PRESSURE: 68 MMHG

## 2023-03-09 DIAGNOSIS — T46.6X5A STATIN-INDUCED MYOSITIS: Primary | ICD-10-CM

## 2023-03-09 DIAGNOSIS — R00.2 PALPITATIONS: ICD-10-CM

## 2023-03-09 DIAGNOSIS — E78.2 MIXED DYSLIPIDEMIA: ICD-10-CM

## 2023-03-09 DIAGNOSIS — M60.9 STATIN-INDUCED MYOSITIS: Primary | ICD-10-CM

## 2023-03-09 RX ORDER — ATORVASTATIN CALCIUM 40 MG/1
40 TABLET, FILM COATED ORAL DAILY
Qty: 30 TABLET | Refills: 3 | Status: CANCELLED | OUTPATIENT
Start: 2023-03-09

## 2023-03-09 NOTE — ASSESSMENT & PLAN NOTE
Uncontrolled. High suspicion for myositis 2/2 statin.    Plan:  -Discontinue atorvastatin  -CK  -Vitamin D level  -Diclofenac for 5 days as needed

## 2023-03-09 NOTE — PROGRESS NOTES
[address] [facility] Internal Medicine   [facility] [address]  [address] [facility], [address]  Dept: [phone]  Dept Fax: [phone]    Dheeraj Rain (1970) is a 46 y.o. female Established patient, here for evaluation of the following chief complaint(s):  Chief Complaint   Patient presents with    Hypertension    Congestive Heart Failure    Other     prediabetes    Hand Pain     Feels like pins and needles    Pain     All over        ASSESSMENT AND PLAN     1. Statin-induced myositis  Assessment & Plan:  Uncontrolled. High suspicion for myositis 2/2 statin. Plan:  -Discontinue atorvastatin  -CK  -Vitamin D level  -Diclofenac for 5 days as needed  Orders:  -     Vitamin D 25 Hydroxy; Future  -     CK; Future  -     diclofenac (VOLTAREN) 50 MG EC tablet; Take 1 tablet by mouth 3 times daily (with meals) for 5 days, Disp-15 tablet, R-0Normal  2. Mixed dyslipidemia  Assessment & Plan:  As above. Orders:  -     Vitamin D 25 Hydroxy; Future  -     CK; Future  3. Palpitations  Assessment & Plan:  Holter Monitor -vladimir for arrhythmias. Plan:  -TSH  -FT4  -Discontinue amiodarone  Orders:  -     TSH; Future  -     T4, Free; Future    Return in about 4 weeks (around 4/6/2023) for Progress on statin-induced muscle pains. . with Meet Giles MD     HPI     Chief Complaint   Patient presents with    Hypertension    Congestive Heart Failure    Other     prediabetes    Hand Pain     Feels like pins and needles    Pain     All over       HPI  Pt Dheeraj Rain is a 46 y.o. female here for f/u. Pt states she has been having full body muscle aches since initiation of atorvastatin. No hematuria per patient. #\"Heart Palpitation\": Active  Started 1 year ago, stopped briefly, then started again. Pt was reportedly hospitalized 4 times - pt was asked to change her diet, exercise, eat fruits and vegetables, and stop eating chocolate. Denies h/o MI. [facility]  Takes amiodarone 200 mg daily for this, pt reports that heart palpitations have improved with this. Also takes plavix 75 mg daily for \"heart palpitations\" per physician in [facility]. Unclear indication for any of these medications. Does have accompanying headaches and occasional dizziness. EKG 01/05/23 with TWI in V1 and V2 but no prior EKG for comparison, also LVH. 14-day cardiac event monitor placed 01/16/23, results pending. TSH and FT4 ordered but not obtained. CMP / Mg / Phos wnl. #Subacute vs Chronic  HFmrEF with EF 45% on  01/16/23 , NYHA Class I 2/2 ICM vs NICM: Active  Diagnosed in  01/16/23 . Does note have dyspnea on exertion, orthopnea, paroxysmal nocturnal dyspnea, cardiac asthma, lower extremity edema bilaterally, hepatic venous congestion, or congestive gastropathy. Does not have bilateral basilar rales on auscultation, laterally displaced apical heart beat, coolness and pallor of lower extremities, jugular venous distension, kussmaul sign, hepatosplenomegaly, or hepatojugular reflex. Pro-BNP not obtained. CXR not obtained. GDMT at home: candesartan. Diuretics at home: none. Follows in HF Clinic: No.      #Well-controlled Primary HTN: Active  BP: 130/80 mmHg. HR: 84 beats/min. LVH on TTE 01/16/23. On candesartan and amlodipine outpatient. #Mixed Dyslipidemia: Active  Last fasting lipid panel 01/09/23: Tchol 285 mg/dL,  mg/dL, HDL 42 mg/dL,  mg/dL. Takes nothing at home. 10-year ASCVD risk is 8.0% which reduces to 1.3% with optimization of risk factors. Lifetime ASCVD risk is 50% which reduces to 8.0% with optimization of risk factors. #Pre-Diabetes A1c 6.0% on  01/05/23 : Active  Not on pharmacotherapy. High risk for progression to T2DM. #Obesity Class II (35.0-39.9 kg/m2): Active  Body mass index is 35.77 kg/m². STOP-BANG previously positive.  continues to deny apneic events but does endorse severe snoring. #Health Maintenance: Stable  Prioritized the aforementioned problems for now.  Will discuss screening during next visit. Pt currently does not have insurance and is working on getting on her 's insurance.   Pneumococcal Vaccine: Will discuss during next visit  HIV screen: Will discuss during next visit  Hepatitis C screen: Will discuss during next visit  Tetanus Vaccine: Will discuss during next visit  Cervical Cancer Screening: Will discuss during next visit  COVID-19 Vaccine: Will discuss during next visit  Breast Cancer Screening: Will discuss during next visit  Colorectal Cancer Screening: Will discuss during next visit  Shingles Vaccine: Will discuss during next visit    Medications:    Current Outpatient Medications:     diclofenac (VOLTAREN) 50 MG EC tablet, Take 1 tablet by mouth 3 times daily (with meals) for 5 days, Disp: 15 tablet, Rfl: 0    candesartan (ATACAND) 8 MG tablet, Take 1 tablet by mouth daily, Disp: 30 tablet, Rfl: 3    aspirin EC 81 MG EC tablet, Take 1 tablet by mouth daily, Disp: 90 tablet, Rfl: 1    metoprolol succinate (TOPROL XL) 25 MG extended release tablet, Take 1 tablet by mouth daily, Disp: 90 tablet, Rfl: 1    spironolactone (ALDACTONE) 50 MG tablet, Take 1 tablet by mouth daily, Disp: 90 tablet, Rfl: 1    The patient is allergic to dust mite extract.    Past Medical History:  Anna  has a past medical history of Heart failure with reduced ejection fraction, NYHA class I (HCC), Hypertension, and Palpitations.    Past Surgical History:  The patient  has a past surgical history that includes  section.    Family History:  This patient's family history includes High Blood Pressure in her brother, father, mother, and sister.    Social History:  Anna  reports that she has never smoked. She has never been exposed to tobacco smoke. She has never used smokeless tobacco. She reports that she does not currently use alcohol after a past usage of about 1.0 standard drink per week. She reports that she does not use drugs.     Health Maintenance   Topic Date Due     Pneumococcal 0-64 years Vaccine (1 - PCV) Never done    HIV screen  Never done    Hepatitis C screen  Never done    DTaP/Tdap/Td vaccine (1 - Tdap) Never done    Cervical cancer screen  Never done    Colorectal Cancer Screen  Never done    COVID-19 Vaccine (2 - Booster for Jessica series) 06/03/2020    Breast cancer screen  Never done    Shingles vaccine (1 of 2) Never done    Flu vaccine (1) Never done    Depression Screen  01/05/2024    A1C test (Diabetic or Prediabetic)  01/09/2024    Lipids  01/09/2028    Hepatitis A vaccine  Aged Out    Hib vaccine  Aged Out    Meningococcal (ACWY) vaccine  Aged Out       ROS:  Review of Systems - negative except for the aforementioned. PHYSICAL EXAMINATION     Vitals:    03/09/23 1505   BP: 122/68   Pulse: 68   Temp: 97.4 °F (36.3 °C)       Body mass index is 35.1 kg/m². Physical Exam  Vitals and nursing note reviewed. Constitutional:       General: She is awake. Appearance: Normal appearance. She is obese. She is not ill-appearing or diaphoretic. HENT:      Head: Normocephalic and atraumatic. Right Ear: External ear normal.      Left Ear: External ear normal.      Nose: Nose normal.      Mouth/Throat:      Mouth: Mucous membranes are moist.      Pharynx: Oropharynx is clear. No oropharyngeal exudate or posterior oropharyngeal erythema. Eyes:      General: Lids are normal. No scleral icterus. Extraocular Movements: Extraocular movements intact. Pupils: Pupils are equal, round, and reactive to light. Cardiovascular:      Rate and Rhythm: Normal rate and regular rhythm. Pulses: Normal pulses. Radial pulses are 2+ on the right side and 2+ on the left side. Posterior tibial pulses are 2+ on the right side and 2+ on the left side. Heart sounds: Normal heart sounds. No murmur heard. No friction rub. No gallop. Pulmonary:      Effort: Pulmonary effort is normal.      Breath sounds: Normal breath sounds.  No wheezing, rhonchi or rales. Abdominal:      General: Bowel sounds are normal.      Palpations: Abdomen is soft. Tenderness: There is no abdominal tenderness. There is no guarding or rebound. Musculoskeletal:      Cervical back: Normal range of motion and neck supple. Right lower leg: No edema. Left lower leg: No edema. Skin:     General: Skin is warm and dry. Neurological:      General: No focal deficit present. Mental Status: She is alert and oriented to person, place, and time. Mental status is at baseline. GCS: GCS eye subscore is 4. GCS verbal subscore is 5. GCS motor subscore is 6. Psychiatric:         Attention and Perception: Attention and perception normal.         Mood and Affect: Mood and affect normal.         Speech: Speech normal.         Behavior: Behavior normal. Behavior is cooperative. Thought Content: Thought content normal.         Cognition and Memory: Cognition and memory normal.         Judgment: Judgment normal.       MOST RECENT DIAGNOSTIC DATA     Labs Reviewed 3/9/2023:    Lab Results   Component Value Date    WBC 6.2 01/09/2023    HGB 13.0 01/09/2023    HCT 40.1 01/09/2023     01/09/2023    HDL 42 01/09/2023    ALT 19 01/09/2023    AST 20 01/09/2023     01/09/2023    K 4.2 01/09/2023     01/09/2023    CREATININE 0.8 01/09/2023    BUN 10 01/09/2023    CO2 27 01/09/2023    LABA1C 6.4 01/09/2023       Radiology  As above    Other Studies  As above    Assessment and plan created with review by Dr. Adrien Healy. Alejo Sarkar MD.  All findings, labs and other data reviewed in conjunction with Dr. Adrien Healy. Alejo Sarkra MD.    An electronic signature was used to authenticate this note.     Electronically signed by Simone Oro MD on 3/9/2023 at [address] PM  Internal Medicine PGY-2  [facility] [place], [address]

## 2023-03-11 ENCOUNTER — HOSPITAL ENCOUNTER (OUTPATIENT)
Age: 53
Discharge: HOME | End: 2023-03-11

## 2023-03-11 DIAGNOSIS — E78.2 MIXED DYSLIPIDEMIA: ICD-10-CM

## 2023-03-11 DIAGNOSIS — T46.6X5A STATIN-INDUCED MYOSITIS: ICD-10-CM

## 2023-03-11 DIAGNOSIS — R00.2 PALPITATIONS: ICD-10-CM

## 2023-03-11 DIAGNOSIS — M60.9 STATIN-INDUCED MYOSITIS: ICD-10-CM

## 2023-03-11 DIAGNOSIS — I10 PRIMARY HYPERTENSION: ICD-10-CM

## 2023-03-11 DIAGNOSIS — I50.22 HEART FAILURE WITH MID-RANGE EJECTION FRACTION (HCC): ICD-10-CM

## 2023-03-11 LAB
25(OH)D3 SERPL-MCNC: 29 NG/ML (ref 30–100)
ANION GAP SERPL CALC-SCNC: 10 MEQ/L (ref 8–16)
BUN SERPL-MCNC: 12 MG/DL (ref 7–22)
CALCIUM SERPL-MCNC: 9.7 MG/DL (ref 8.5–10.5)
CHLORIDE SERPL-SCNC: 103 MEQ/L (ref 98–111)
CHOLEST SERPL-MCNC: 181 MG/DL (ref 100–199)
CK SERPL-CCNC: 236 U/L (ref 30–135)
CO2 SERPL-SCNC: 26 MEQ/L (ref 23–33)
CREAT SERPL-MCNC: 0.8 MG/DL (ref 0.4–1.2)
GFR SERPL CREATININE-BSD FRML MDRD: > 60 ML/MIN/1.73M2
GLUCOSE SERPL-MCNC: 81 MG/DL (ref 70–108)
HDLC SERPL-MCNC: 40 MG/DL
LDLC SERPL CALC-MCNC: 98 MG/DL
POTASSIUM SERPL-SCNC: 3.8 MEQ/L (ref 3.5–5.2)
SODIUM SERPL-SCNC: 139 MEQ/L (ref 135–145)
T4 FREE SERPL-MCNC: 1.22 NG/DL (ref 0.93–1.76)
TRIGL SERPL-MCNC: 213 MG/DL (ref 0–199)
TSH SERPL DL<=0.005 MIU/L-ACNC: 2.24 UIU/ML (ref 0.4–4.2)

## 2023-03-11 PROCEDURE — 84443 ASSAY THYROID STIM HORMONE: CPT

## 2023-03-11 PROCEDURE — 82306 VITAMIN D 25 HYDROXY: CPT

## 2023-03-11 PROCEDURE — 80048 BASIC METABOLIC PNL TOTAL CA: CPT

## 2023-03-11 PROCEDURE — 84439 ASSAY OF FREE THYROXINE: CPT

## 2023-03-11 PROCEDURE — 36415 COLL VENOUS BLD VENIPUNCTURE: CPT

## 2023-03-11 PROCEDURE — 80061 LIPID PANEL: CPT

## 2023-03-11 PROCEDURE — 82550 ASSAY OF CK (CPK): CPT

## 2023-03-17 ENCOUNTER — TELEPHONE (OUTPATIENT)
Dept: INTERNAL MEDICINE CLINIC | Age: 53
End: 2023-03-17

## 2023-03-17 NOTE — TELEPHONE ENCOUNTER
Received message from patients  Jyoti Gomez stating she needs medication for worms, he requests Albendazole be called in for her. Return call placed to Jyoti Gomez for more information. No answer on phone and voicemail is full.

## 2023-03-21 ENCOUNTER — HOSPITAL ENCOUNTER (OUTPATIENT)
Age: 53
Setting detail: SPECIMEN
Discharge: HOME | End: 2023-03-21

## 2023-03-23 ENCOUNTER — HOSPITAL ENCOUNTER (EMERGENCY)
Age: 53
Discharge: HOME | End: 2023-03-23

## 2023-03-23 VITALS
DIASTOLIC BLOOD PRESSURE: 75 MMHG | SYSTOLIC BLOOD PRESSURE: 133 MMHG | TEMPERATURE: 97.3 F | HEART RATE: 85 BPM | RESPIRATION RATE: 18 BRPM | OXYGEN SATURATION: 100 %

## 2023-03-23 DIAGNOSIS — R52 BODY ACHES: Primary | ICD-10-CM

## 2023-03-23 DIAGNOSIS — R51.9 ACUTE INTRACTABLE HEADACHE, UNSPECIFIED HEADACHE TYPE: ICD-10-CM

## 2023-03-23 PROCEDURE — 99283 EMERGENCY DEPT VISIT LOW MDM: CPT

## 2023-03-23 PROCEDURE — 36415 COLL VENOUS BLD VENIPUNCTURE: CPT

## 2023-03-23 PROCEDURE — 87207 SMEAR SPECIAL STAIN: CPT

## 2023-03-23 RX ORDER — ONDANSETRON 4 MG/1
4 TABLET, ORALLY DISINTEGRATING ORAL 3 TIMES DAILY PRN
Qty: 21 TABLET | Refills: 0 | Status: SHIPPED | OUTPATIENT
Start: 2023-03-23

## 2023-03-23 RX ORDER — ALBENDAZOLE 200 MG/1
400 TABLET, FILM COATED ORAL 2 TIMES DAILY
Qty: 28 TABLET | Refills: 0 | Status: SHIPPED | OUTPATIENT
Start: 2023-03-23 | End: 2023-03-30

## 2023-03-23 NOTE — DISCHARGE INSTRUCTIONS
Rest, stay well-hydrated. Continue home medications as previously prescribed. Over-the-counter Tylenol as needed for headaches and body aches. Zofran as prescribed for nausea. Follow-up with Dr. Aurea Thompson within the next week for reevaluation. Malaria testing will be done in the next 24 hours please follow-up on testing. If any worsening or concerns return to ER immediately.

## 2023-03-23 NOTE — ED PROVIDER NOTES
Bowel sounds are normal. There is no distension. Palpations: Abdomen is soft. There is no mass. Tenderness: There is no abdominal tenderness. Hernia: No hernia is present. Musculoskeletal:         General: No swelling, tenderness, deformity or signs of injury. Normal range of motion. Cervical back: Normal range of motion. No rigidity or tenderness. Lymphadenopathy:      Cervical: No cervical adenopathy. Skin:     General: Skin is warm and dry. Capillary Refill: Capillary refill takes less than 2 seconds. Coloration: Skin is not jaundiced or pale. Findings: No bruising or erythema. Neurological:      General: No focal deficit present. Mental Status: She is alert and oriented to person, place, and time. Psychiatric:         Mood and Affect: Mood normal.         Behavior: Behavior normal.       FORMAL DIAGNOSTIC RESULTS     RADIOLOGY: Interpretation per the Radiologist below, if available at the time of this note (none if blank): No orders to display       LABS: (none if blank)  Labs Reviewed   MALARIA SMEAR       (Any cultures that may have been sent were not resulted at the time of this patient visit)    [facility] / ED COURSE:     1) Number and Complexity of Problems            Problem List This Visit:         Chief Complaint   Patient presents with    Headache    Generalized Body Aches            Differential Diagnosis includes (but not limited to):   Malaria, pin worms, tape worms, viral illness          Diagnoses Considered but I have low suspicion of:   Covid, Influenza              Pertinent Comorbid Conditions:    Hypertension, prediabetes, heart failure, dyslipidemia    2)  Data Reviewed (none if left blank)          My Independent interpretations:     EKG:          Imaging:     Labs:        See ED course                  Decision Rules/Clinical Scores utilized:              External Documentation Reviewed:         Previous patient encounter

## 2023-03-23 NOTE — ED TRIAGE NOTES
Pt in through [facility]. She states that she came from [place] on January 1st 2023. Last week she began having symptoms of headache, body aches, and nausea. She states \"I think I have malaria\". She states 2 days ago she was seen at walk in clinic and tested negative for COVID 19 and influenza. She also states \"I think I have worms. My stomach is upset and I am more hungry. They use to give me medication or this in [place]\".

## 2023-03-24 LAB
MICROORGANISM SPEC CULT: ABNORMAL
SPECIMEN DESCRIPTION: ABNORMAL

## 2023-03-25 LAB
MALARIA SMEAR BLD: NORMAL
REVIEWED BY: NORMAL

## 2023-09-29 ENCOUNTER — HOSPITAL ENCOUNTER (EMERGENCY)
Age: 53
Discharge: HOME | End: 2023-09-29

## 2023-09-29 VITALS
DIASTOLIC BLOOD PRESSURE: 76 MMHG | SYSTOLIC BLOOD PRESSURE: 117 MMHG | TEMPERATURE: 98 F | BODY MASS INDEX: 37.77 KG/M2 | RESPIRATION RATE: 16 BRPM | HEART RATE: 70 BPM | OXYGEN SATURATION: 97 % | WEIGHT: 235 LBS | HEIGHT: 66 IN

## 2023-09-29 DIAGNOSIS — Z76.0 ENCOUNTER FOR MEDICATION REFILL: Primary | ICD-10-CM

## 2023-09-29 DIAGNOSIS — I50.9 CONGESTIVE HEART FAILURE, UNSPECIFIED HF CHRONICITY, UNSPECIFIED HEART FAILURE TYPE (HCC): ICD-10-CM

## 2023-09-29 DIAGNOSIS — R00.2 PALPITATIONS: ICD-10-CM

## 2023-09-29 PROCEDURE — 99213 OFFICE O/P EST LOW 20 MIN: CPT

## 2023-09-29 PROCEDURE — 99213 OFFICE O/P EST LOW 20 MIN: CPT | Performed by: NURSE PRACTITIONER

## 2023-09-29 RX ORDER — METOPROLOL SUCCINATE 25 MG/1
25 TABLET, EXTENDED RELEASE ORAL DAILY
Qty: 7 TABLET | Refills: 0 | Status: SHIPPED | OUTPATIENT
Start: 2023-09-29 | End: 2023-10-03

## 2023-09-29 ASSESSMENT — ENCOUNTER SYMPTOMS
VOMITING: 0
NAUSEA: 0
COUGH: 0
SORE THROAT: 0
SHORTNESS OF BREATH: 0

## 2023-09-29 ASSESSMENT — PAIN - FUNCTIONAL ASSESSMENT: PAIN_FUNCTIONAL_ASSESSMENT: NONE - DENIES PAIN

## 2023-09-29 NOTE — ED PROVIDER NOTES
[facility]  Urgent Care Encounter       CHIEF COMPLAINT       Chief Complaint   Patient presents with    Medication Refill     BP medication       Nurses Notes reviewed and I agree except as noted in the HPI. HISTORY OF PRESENT ILLNESS   Devora Morales is a 48 y.o. female who presents requesting a refill of her metoprolol. Patient does have an appointment with her PCP, Dr Rama Pope, in 4 days but states that she has been out of her metoprolol for the past 3 days. She contacted the office today and was told that there is no physician in the office and therefore they cannot refill her medication. Patient states that she is on the metoprolol for palpitations and heart failure and currently does not have any symptoms of shortness of breath, palpitations, headaches, blurry vision or any other symptoms at this time. The history is provided by the patient. REVIEW OF SYSTEMS     Review of Systems   Constitutional:  Negative for chills and fever. HENT:  Negative for congestion and sore throat. Respiratory:  Negative for cough and shortness of breath. Cardiovascular:  Negative for chest pain. Gastrointestinal:  Negative for nausea and vomiting. Musculoskeletal:  Negative for arthralgias and myalgias. Skin:  Negative for rash. Allergic/Immunologic: Negative for immunocompromised state. Neurological:  Negative for headaches. PAST MEDICAL HISTORY         Diagnosis Date    Heart failure with reduced ejection fraction, NYHA class I ([address]) 2023    Hypertension     Palpitations        SURGICALHISTORY     Patient  has a past surgical history that includes  section.     CURRENT MEDICATIONS       Previous Medications    ASPIRIN EC 81 MG EC TABLET    Take 1 tablet by mouth daily    DICLOFENAC (VOLTAREN) 50 MG EC TABLET    Take 1 tablet by mouth 3 times daily (with meals) for 5 days    METOPROLOL SUCCINATE (TOPROL XL) 25 MG EXTENDED RELEASE TABLET    Take 1 tablet

## 2023-10-03 ENCOUNTER — TELEPHONE (OUTPATIENT)
Dept: CARDIOLOGY CLINIC | Age: 53
End: 2023-10-03

## 2023-10-03 ENCOUNTER — OFFICE VISIT (OUTPATIENT)
Dept: INTERNAL MEDICINE CLINIC | Age: 53
End: 2023-10-03

## 2023-10-03 VITALS
RESPIRATION RATE: 18 BRPM | TEMPERATURE: 97.8 F | WEIGHT: 224.2 LBS | DIASTOLIC BLOOD PRESSURE: 74 MMHG | SYSTOLIC BLOOD PRESSURE: 116 MMHG | OXYGEN SATURATION: 98 % | BODY MASS INDEX: 36.19 KG/M2 | HEART RATE: 72 BPM

## 2023-10-03 DIAGNOSIS — R73.03 PRE-DIABETES: ICD-10-CM

## 2023-10-03 DIAGNOSIS — R00.2 PALPITATIONS: Primary | ICD-10-CM

## 2023-10-03 DIAGNOSIS — I50.22 HEART FAILURE WITH MID-RANGE EJECTION FRACTION (HCC): ICD-10-CM

## 2023-10-03 LAB — HBA1C MFR BLD: 5.7 % (ref 4.3–5.7)

## 2023-10-03 RX ORDER — SPIRONOLACTONE 50 MG/1
50 TABLET, FILM COATED ORAL DAILY
Qty: 90 TABLET | Refills: 3 | Status: SHIPPED | OUTPATIENT
Start: 2023-10-03

## 2023-10-03 RX ORDER — METOPROLOL SUCCINATE 25 MG/1
25 TABLET, EXTENDED RELEASE ORAL DAILY
Qty: 90 TABLET | Refills: 3 | Status: SHIPPED | OUTPATIENT
Start: 2023-10-03

## 2023-10-03 RX ORDER — EZETIMIBE 10 MG/1
10 TABLET ORAL DAILY
Qty: 90 TABLET | Refills: 3 | Status: SHIPPED | OUTPATIENT
Start: 2023-10-03

## 2023-10-03 RX ORDER — CANDESARTAN 4 MG/1
4 TABLET ORAL DAILY
Qty: 90 TABLET | Refills: 3 | Status: SHIPPED | OUTPATIENT
Start: 2023-10-03

## 2023-10-03 RX ORDER — AMIODARONE HYDROCHLORIDE 200 MG/1
200 TABLET ORAL DAILY
Qty: 30 TABLET | Refills: 3 | Status: SHIPPED | OUTPATIENT
Start: 2023-10-03

## 2023-10-03 NOTE — TELEPHONE ENCOUNTER
LM for patient to call office back. Received referral in epic for Heart failure with mid-range ejection fraction.

## 2023-10-03 NOTE — PROGRESS NOTES
1970    Chief Complaint   Patient presents with    Congestive Heart Failure    Hypertension    Hyperlipidemia    Other     Pre-Diabetes           HPI  Pt is a 53 y.o. female with PMH of HTN, HLD, palpitations, and HFrEF. She presents today for a today for medication refills. She presented to the ED 23 for medication refills because she ran out of her home medications. They refilled her metoprolol for 7 days and had her follow up today as she already had the appointment scheduled. Her BP was 116/74 in the office today. She denies palpitation, SOB, headache, blurry vision, and chest pain.     Past Medical History:   Diagnosis Date    Heart failure with reduced ejection fraction, NYHA class I ([facility]) 2023    Hypertension     Palpitations        Past Surgical History:   Procedure Laterality Date     SECTION      Has had twice       Current Outpatient Medications   Medication Sig Dispense Refill    ondansetron (ZOFRAN-ODT) 4 MG disintegrating tablet Take 1 tablet by mouth 3 times daily as needed for Nausea or Vomiting 21 tablet 0    diclofenac (VOLTAREN) 50 MG EC tablet Take 1 tablet by mouth 3 times daily (with meals) for 5 days 15 tablet 0    metoprolol succinate (TOPROL XL) 25 MG extended release tablet Take 1 tablet by mouth daily 90 tablet 1    spironolactone (ALDACTONE) 50 MG tablet Take 1 tablet by mouth daily 90 tablet 1    aspirin EC 81 MG EC tablet Take 1 tablet by mouth daily (Patient not taking: Reported on 10/3/2023) 90 tablet 1     No current facility-administered medications for this visit.       Allergies   Allergen Reactions    Dust Mite Extract        Social History     Socioeconomic History    Marital status:      Spouse name: Not on file    Number of children: Not on file    Years of education: Not on file    Highest education level: Not on file   Occupational History    Not on file   Tobacco Use    Smoking status: Never     Passive exposure: Never    Smokeless tobacco:

## 2023-10-04 ENCOUNTER — TELEPHONE (OUTPATIENT)
Dept: CARDIOLOGY CLINIC | Age: 53
End: 2023-10-04

## 2023-10-04 NOTE — TELEPHONE ENCOUNTER
Pt spouse, Maurilio Holder is calling and they are self pay and are wanting to get a price estimate of how much the NP visit will be with Dr. Lorelee Prader.   Please advise Maurilio Holder at [phone]

## 2023-11-07 ENCOUNTER — OFFICE VISIT (OUTPATIENT)
Dept: CARDIOLOGY CLINIC | Age: 53
End: 2023-11-07

## 2023-11-07 VITALS
HEART RATE: 84 BPM | SYSTOLIC BLOOD PRESSURE: 138 MMHG | WEIGHT: 228.25 LBS | HEIGHT: 66 IN | DIASTOLIC BLOOD PRESSURE: 84 MMHG | BODY MASS INDEX: 36.68 KG/M2

## 2023-11-07 DIAGNOSIS — I51.9 ASYMPTOMATIC LV DYSFUNCTION: ICD-10-CM

## 2023-11-07 DIAGNOSIS — R00.2 PALPITATION: Primary | ICD-10-CM

## 2023-11-07 PROCEDURE — 3075F SYST BP GE 130 - 139MM HG: CPT | Performed by: INTERNAL MEDICINE

## 2023-11-07 PROCEDURE — 99204 OFFICE O/P NEW MOD 45 MIN: CPT | Performed by: INTERNAL MEDICINE

## 2023-11-07 PROCEDURE — 3079F DIAST BP 80-89 MM HG: CPT | Performed by: INTERNAL MEDICINE

## 2024-08-18 ENCOUNTER — HOSPITAL ENCOUNTER (EMERGENCY)
Age: 54
Discharge: HOME | End: 2024-08-18

## 2024-08-18 VITALS
TEMPERATURE: 97 F | SYSTOLIC BLOOD PRESSURE: 131 MMHG | HEART RATE: 85 BPM | OXYGEN SATURATION: 97 % | RESPIRATION RATE: 16 BRPM | DIASTOLIC BLOOD PRESSURE: 79 MMHG

## 2024-08-18 DIAGNOSIS — Z01.30 BLOOD PRESSURE CHECK: Primary | ICD-10-CM

## 2024-08-18 PROCEDURE — 99213 OFFICE O/P EST LOW 20 MIN: CPT

## 2024-08-18 PROCEDURE — 93005 ELECTROCARDIOGRAM TRACING: CPT | Performed by: EMERGENCY MEDICINE

## 2024-08-18 NOTE — DISCHARGE INSTRUCTIONS
Monitor your blood pressure.  Get some rest.  Can follow-up with cardiology or family doctor in 3 days.  Go to the emergency room for any chest pain, shortness of breath or any new or worsening symptoms.

## 2024-08-18 NOTE — ED PROVIDER NOTES
[facility] URGENT CARE  Urgent Care Encounter       CHIEF COMPLAINT       Chief Complaint   Patient presents with    Hypertension    Shortness of Breath    Dizziness       Nurses Notes reviewed and I agree except as noted in the HPI.  HISTORY OF PRESENT ILLNESS   Anna Bhakta is a 54 y.o. female who presents to urgent care for high blood pressure, shortness of breath and dizziness.  Symptoms started after she took her blood pressure at [facility] is was 150/83 and talked to her daughter and got anxious and came to urgent care to get evaluated.  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    The history is provided by the patient. No  was used.       REVIEW OF SYSTEMS     Review of Systems   Constitutional: Negative.    HENT: Negative.     Eyes: Negative.    Respiratory:  Positive for shortness of breath. Negative for chest tightness.    Cardiovascular:  Negative for chest pain and palpitations.   Gastrointestinal: Negative.    Endocrine: Negative.    Genitourinary: Negative.    Allergic/Immunologic: Negative.    Neurological:  Positive for dizziness.   Psychiatric/Behavioral: Negative.         PAST MEDICAL HISTORY         Diagnosis Date    Heart failure with reduced ejection fraction, NYHA class I ([facility]) 2023    Hypertension     Palpitations        SURGICALHISTORY     Patient  has a past surgical history that includes  section.    CURRENT MEDICATIONS       Previous Medications    AMIODARONE (CORDARONE) 200 MG TABLET    Take 1 tablet by mouth daily    ASPIRIN EC 81 MG EC TABLET    Take 1 tablet by mouth daily    CANDESARTAN (ATACAND) 4 MG TABLET    Take 1 tablet by mouth daily    EZETIMIBE (ZETIA) 10 MG TABLET    Take 1 tablet by mouth daily    METOPROLOL SUCCINATE (TOPROL XL) 25 MG EXTENDED RELEASE TABLET    Take 1 tablet by mouth daily    SPIRONOLACTONE (ALDACTONE) 50 MG TABLET    Take 1 tablet by mouth daily       ALLERGIES     Patient is is

## 2024-08-18 NOTE — ED NOTES
To [facility] with complaints of hypertension. States she checked her BP at [facility] and it was 150/83. Has been having headaches and dizziness. Also some SOB. Pts medications were switched [place]. No chest pain     Nicole Diaz RN  08/18/24 1372

## 2024-08-19 LAB
EKG ATRIAL RATE: 76 BPM
EKG P AXIS: 45 DEGREES
EKG P-R INTERVAL: 170 MS
EKG Q-T INTERVAL: 382 MS
EKG QRS DURATION: 96 MS
EKG QTC CALCULATION (BAZETT): 429 MS
EKG R AXIS: 35 DEGREES
EKG T AXIS: 2 DEGREES
EKG VENTRICULAR RATE: 76 BPM

## 2024-08-19 PROCEDURE — 93010 ELECTROCARDIOGRAM REPORT: CPT | Performed by: INTERNAL MEDICINE

## 2024-10-15 DIAGNOSIS — R00.2 PALPITATIONS: ICD-10-CM

## 2024-10-15 DIAGNOSIS — I50.22 HEART FAILURE WITH MID-RANGE EJECTION FRACTION (HCC): ICD-10-CM

## 2024-10-15 RX ORDER — METOPROLOL SUCCINATE 25 MG/1
25 TABLET, EXTENDED RELEASE ORAL DAILY
Qty: 30 TABLET | Refills: 0 | Status: SHIPPED | OUTPATIENT
Start: 2024-10-15

## 2024-10-23 ENCOUNTER — OFFICE VISIT (OUTPATIENT)
Dept: INTERNAL MEDICINE CLINIC | Age: 54
End: 2024-10-23

## 2024-10-23 VITALS
WEIGHT: 227.6 LBS | HEART RATE: 84 BPM | HEIGHT: 66 IN | DIASTOLIC BLOOD PRESSURE: 83 MMHG | BODY MASS INDEX: 36.58 KG/M2 | SYSTOLIC BLOOD PRESSURE: 172 MMHG | OXYGEN SATURATION: 98 %

## 2024-10-23 DIAGNOSIS — I50.22 HEART FAILURE WITH MID-RANGE EJECTION FRACTION (HCC): ICD-10-CM

## 2024-10-23 PROCEDURE — 99213 OFFICE O/P EST LOW 20 MIN: CPT

## 2024-10-23 PROCEDURE — 3077F SYST BP >= 140 MM HG: CPT

## 2024-10-23 PROCEDURE — 3079F DIAST BP 80-89 MM HG: CPT

## 2024-10-23 RX ORDER — SPIRONOLACTONE 50 MG/1
50 TABLET, FILM COATED ORAL DAILY
Qty: 90 TABLET | Refills: 3 | Status: SHIPPED | OUTPATIENT
Start: 2024-10-23

## 2024-10-23 RX ORDER — CANDESARTAN 4 MG/1
4 TABLET ORAL DAILY
Qty: 90 TABLET | Refills: 3 | Status: SHIPPED | OUTPATIENT
Start: 2024-10-23

## 2024-10-23 RX ORDER — EZETIMIBE 10 MG/1
10 TABLET ORAL DAILY
Qty: 90 TABLET | Refills: 3 | Status: SHIPPED | OUTPATIENT
Start: 2024-10-23

## 2024-10-23 RX ORDER — METOPROLOL SUCCINATE 25 MG/1
25 TABLET, EXTENDED RELEASE ORAL DAILY
Qty: 90 TABLET | Refills: 0 | Status: SHIPPED | OUTPATIENT
Start: 2024-10-23

## 2024-10-23 SDOH — ECONOMIC STABILITY: FOOD INSECURITY: WITHIN THE PAST 12 MONTHS, THE FOOD YOU BOUGHT JUST DIDN'T LAST AND YOU DIDN'T HAVE MONEY TO GET MORE.: NEVER TRUE

## 2024-10-23 SDOH — ECONOMIC STABILITY: FOOD INSECURITY: WITHIN THE PAST 12 MONTHS, YOU WORRIED THAT YOUR FOOD WOULD RUN OUT BEFORE YOU GOT MONEY TO BUY MORE.: NEVER TRUE

## 2024-10-23 SDOH — ECONOMIC STABILITY: INCOME INSECURITY: HOW HARD IS IT FOR YOU TO PAY FOR THE VERY BASICS LIKE FOOD, HOUSING, MEDICAL CARE, AND HEATING?: NOT HARD AT ALL

## 2024-10-23 ASSESSMENT — PATIENT HEALTH QUESTIONNAIRE - PHQ9
SUM OF ALL RESPONSES TO PHQ QUESTIONS 1-9: 0
2. FEELING DOWN, DEPRESSED OR HOPELESS: NOT AT ALL
SUM OF ALL RESPONSES TO PHQ QUESTIONS 1-9: 0
SUM OF ALL RESPONSES TO PHQ QUESTIONS 1-9: 0
SUM OF ALL RESPONSES TO PHQ9 QUESTIONS 1 & 2: 0
1. LITTLE INTEREST OR PLEASURE IN DOING THINGS: NOT AT ALL
SUM OF ALL RESPONSES TO PHQ QUESTIONS 1-9: 0

## 2024-10-23 NOTE — PROGRESS NOTES
[facility] Internal Medicine   [address]  [place] [address]  Dept: [phone]  Dept Fax: [phone]    Chief complaint(s):  Hypertension, Congestive Heart Failure, Hyperlipidemia, and prediabetes    ASSESSMENT AND PLAN     1. Heart failure with mid-range ejection fraction, NYHA I (HCC)      Patient has come in for refills of her medication.  She was last seen approximately 1 year ago.  She has not followed up with her cardiologist or her primary care provider and hence she ran out of her refills.  Patient states she is does not like to see doctors as it disrupts her day today activities.  We discussed in details the importance of seeing her physicians on a regular basis in regards to her medical conditions including hypertension and heart failure.  We discussed the risk of uncontrolled disease processes which she is in agreement with.  Refills for her medication were prescribed  Patient to follow-up in 6 weeks with to her blood pressure readings at home and recorded.  Patient declined basic blood work at this time but did report she would be more open to it in 6 weeks.  Basic blood work: BMP CBC TSH lipid panel.  Patient has declined any vaccinations at this time    No orders of the defined types were placed in this encounter.    New Prescriptions    No medications on file       The risks and benefits of my recommendations, as well as other treatment options (if indicated) were discussed with the patient and  today. Questions were answered.  Follow up: 6 weeks and as needed.    ANDRIA Bhakta (1970) is a pleasant 54 y.o. female here for a follow up visit for problems stated above. Last saw this patient on 10/3/23. Patient has not been seen since last year and hence no longer has refills for her bp medications. She saw her BP was elevated at [facility] and then visited the urgent care which was normal on her arrival.  She has not been following up with her

## 2024-11-04 ENCOUNTER — HOSPITAL ENCOUNTER (OUTPATIENT)
Age: 54
Discharge: HOME | End: 2024-11-06
Attending: INTERNAL MEDICINE

## 2024-11-04 VITALS
WEIGHT: 227 LBS | SYSTOLIC BLOOD PRESSURE: 172 MMHG | BODY MASS INDEX: 36.48 KG/M2 | DIASTOLIC BLOOD PRESSURE: 83 MMHG | HEIGHT: 66 IN

## 2024-11-04 DIAGNOSIS — I51.9 ASYMPTOMATIC LV DYSFUNCTION: ICD-10-CM

## 2024-11-04 DIAGNOSIS — R00.2 PALPITATION: ICD-10-CM

## 2024-11-04 LAB
ECHO AV CUSP MM: 2 CM
ECHO AV PEAK GRADIENT: 8 MMHG
ECHO AV PEAK VELOCITY: 1.4 M/S
ECHO AV VELOCITY RATIO: 0.5
ECHO BSA: 2.19 M2
ECHO LA AREA 2C: 13.3 CM2
ECHO LA AREA 4C: 14.1 CM2
ECHO LA DIAMETER INDEX: 1.56 CM/M2
ECHO LA DIAMETER: 3.3 CM
ECHO LA MAJOR AXIS: 4.4 CM
ECHO LA MINOR AXIS: 4.2 CM
ECHO LA VOL BP: 35 ML (ref 22–52)
ECHO LA VOL MOD A2C: 33 ML (ref 22–52)
ECHO LA VOL MOD A4C: 35 ML (ref 22–52)
ECHO LA VOL/BSA BIPLANE: 17 ML/M2 (ref 16–34)
ECHO LA VOLUME INDEX MOD A2C: 16 ML/M2 (ref 16–34)
ECHO LA VOLUME INDEX MOD A4C: 17 ML/M2 (ref 16–34)
ECHO LV E' LATERAL VELOCITY: 5.7 CM/S
ECHO LV E' SEPTAL VELOCITY: 7 CM/S
ECHO LV EDV A2C: 83 ML
ECHO LV EDV A4C: 158 ML
ECHO LV EDV INDEX A4C: 75 ML/M2
ECHO LV EDV NDEX A2C: 39 ML/M2
ECHO LV EJECTION FRACTION A2C: 34 %
ECHO LV EJECTION FRACTION A4C: 45 %
ECHO LV EJECTION FRACTION BIPLANE: 41 % (ref 55–100)
ECHO LV ESV A2C: 54 ML
ECHO LV ESV A4C: 87 ML
ECHO LV ESV INDEX A2C: 26 ML/M2
ECHO LV ESV INDEX A4C: 41 ML/M2
ECHO LV FRACTIONAL SHORTENING: 23 % (ref 28–44)
ECHO LV INTERNAL DIMENSION DIASTOLE INDEX: 2.65 CM/M2
ECHO LV INTERNAL DIMENSION DIASTOLIC: 5.6 CM (ref 3.9–5.3)
ECHO LV INTERNAL DIMENSION SYSTOLIC INDEX: 2.04 CM/M2
ECHO LV INTERNAL DIMENSION SYSTOLIC: 4.3 CM
ECHO LV IVSD: 1.1 CM (ref 0.6–0.9)
ECHO LV MASS 2D: 249.3 G (ref 67–162)
ECHO LV MASS INDEX 2D: 118.2 G/M2 (ref 43–95)
ECHO LV POSTERIOR WALL DIASTOLIC: 1.1 CM (ref 0.6–0.9)
ECHO LV RELATIVE WALL THICKNESS RATIO: 0.39
ECHO LVOT PEAK GRADIENT: 2 MMHG
ECHO LVOT PEAK VELOCITY: 0.7 M/S
ECHO MV A VELOCITY: 0.79 M/S
ECHO MV E DECELERATION TIME (DT): 232 MS
ECHO MV E VELOCITY: 0.8 M/S
ECHO MV E/A RATIO: 1.01
ECHO MV E/E' LATERAL: 14.04
ECHO MV E/E' RATIO (AVERAGED): 12.73
ECHO MV E/E' SEPTAL: 11.43
ECHO MV REGURGITANT PEAK GRADIENT: 108 MMHG
ECHO MV REGURGITANT PEAK VELOCITY: 5.2 M/S
ECHO PV MAX VELOCITY: 0.8 M/S
ECHO PV PEAK GRADIENT: 2 MMHG
ECHO RV INTERNAL DIMENSION: 2.6 CM
ECHO TV E WAVE: 0.4 M/S
ECHO TV REGURGITANT MAX VELOCITY: 2.47 M/S
ECHO TV REGURGITANT PEAK GRADIENT: 24 MMHG

## 2024-11-04 PROCEDURE — 93306 TTE W/DOPPLER COMPLETE: CPT

## 2024-11-18 ENCOUNTER — OFFICE VISIT (OUTPATIENT)
Dept: CARDIOLOGY CLINIC | Age: 54
End: 2024-11-18

## 2024-11-18 VITALS
DIASTOLIC BLOOD PRESSURE: 88 MMHG | BODY MASS INDEX: 33.77 KG/M2 | HEIGHT: 68 IN | HEART RATE: 62 BPM | WEIGHT: 222.8 LBS | SYSTOLIC BLOOD PRESSURE: 146 MMHG

## 2024-11-18 DIAGNOSIS — I42.9 CARDIOMYOPATHY, UNSPECIFIED TYPE (HCC): ICD-10-CM

## 2024-11-18 DIAGNOSIS — R06.02 SHORTNESS OF BREATH: ICD-10-CM

## 2024-11-18 DIAGNOSIS — I51.9 LV DYSFUNCTION: Primary | ICD-10-CM

## 2024-11-18 PROCEDURE — 3079F DIAST BP 80-89 MM HG: CPT | Performed by: INTERNAL MEDICINE

## 2024-11-18 PROCEDURE — 99214 OFFICE O/P EST MOD 30 MIN: CPT | Performed by: INTERNAL MEDICINE

## 2024-11-18 PROCEDURE — 3077F SYST BP >= 140 MM HG: CPT | Performed by: INTERNAL MEDICINE

## 2024-11-18 NOTE — PROGRESS NOTES
1 year follow up/echo    EKG done 8-18-24  Echo 11-4-24    Pt denies all cardiac symptoms or concerns  
immediate medical attention if there is any new onset of  any chest pain, sob, palpitations, lightheadedness, dizziness, orthopnea, PND or pedal edema.   All medication side effects were discussed in details.    Thank youfor allowing me to participate in the care of this patient.   Please do not hesitate to contact me for any further questions.     Return in about 6 months (around 5/18/2025), or if symptoms worsen or fail to improve, for Review testing, Regular follow up.       Electronically signed by Temo Butler MD [facility]  11/18/2024 at 11:39 AM EST

## 2024-11-22 ENCOUNTER — HOSPITAL ENCOUNTER (OUTPATIENT)
Age: 54
Setting detail: OBSERVATION
Discharge: HOME | End: 2024-11-24
Attending: EMERGENCY MEDICINE | Admitting: NURSE PRACTITIONER

## 2024-11-22 ENCOUNTER — APPOINTMENT (OUTPATIENT)
Dept: GENERAL RADIOLOGY | Age: 54
End: 2024-11-22

## 2024-11-22 ENCOUNTER — APPOINTMENT (OUTPATIENT)
Dept: CT IMAGING | Age: 54
End: 2024-11-22

## 2024-11-22 DIAGNOSIS — R06.02 SHORTNESS OF BREATH: ICD-10-CM

## 2024-11-22 DIAGNOSIS — R94.30 EJECTION FRACTION < 50%: ICD-10-CM

## 2024-11-22 DIAGNOSIS — R06.09 DYSPNEA ON EXERTION: Primary | ICD-10-CM

## 2024-11-22 LAB
ALBUMIN SERPL BCG-MCNC: 4.4 G/DL (ref 3.5–5.1)
ALP SERPL-CCNC: 84 U/L (ref 38–126)
ALT SERPL W/O P-5'-P-CCNC: 19 U/L (ref 11–66)
ANION GAP SERPL CALC-SCNC: 11 MEQ/L (ref 8–16)
AST SERPL-CCNC: 23 U/L (ref 5–40)
BASOPHILS ABSOLUTE: 0 THOU/MM3 (ref 0–0.1)
BASOPHILS NFR BLD AUTO: 0.5 %
BILIRUB SERPL-MCNC: 0.2 MG/DL (ref 0.3–1.2)
BUN SERPL-MCNC: 24 MG/DL (ref 7–22)
CALCIUM SERPL-MCNC: 9.8 MG/DL (ref 8.5–10.5)
CHLORIDE SERPL-SCNC: 100 MEQ/L (ref 98–111)
CO2 SERPL-SCNC: 25 MEQ/L (ref 23–33)
CREAT SERPL-MCNC: 1 MG/DL (ref 0.4–1.2)
D DIMER PPP IA.FEU-MCNC: 793 NG/ML FEU (ref 0–500)
DEPRECATED RDW RBC AUTO: 45.1 FL (ref 35–45)
EKG ATRIAL RATE: 79 BPM
EKG P AXIS: 46 DEGREES
EKG P-R INTERVAL: 158 MS
EKG Q-T INTERVAL: 394 MS
EKG QRS DURATION: 92 MS
EKG QTC CALCULATION (BAZETT): 451 MS
EKG R AXIS: 30 DEGREES
EKG T AXIS: 34 DEGREES
EKG VENTRICULAR RATE: 79 BPM
EOSINOPHIL NFR BLD AUTO: 1.7 %
EOSINOPHILS ABSOLUTE: 0.1 THOU/MM3 (ref 0–0.4)
ERYTHROCYTE [DISTWIDTH] IN BLOOD BY AUTOMATED COUNT: 14.6 % (ref 11.5–14.5)
FLUAV RNA RESP QL NAA+PROBE: NOT DETECTED
FLUBV RNA RESP QL NAA+PROBE: NOT DETECTED
GFR SERPL CREATININE-BSD FRML MDRD: 67 ML/MIN/1.73M2
GLUCOSE SERPL-MCNC: 84 MG/DL (ref 70–108)
HCT VFR BLD AUTO: 39.5 % (ref 37–47)
HGB BLD-MCNC: 12.8 GM/DL (ref 12–16)
IMM GRANULOCYTES # BLD AUTO: 0.02 THOU/MM3 (ref 0–0.07)
IMM GRANULOCYTES NFR BLD AUTO: 0.3 %
LYMPHOCYTES ABSOLUTE: 4.1 THOU/MM3 (ref 1–4.8)
LYMPHOCYTES NFR BLD AUTO: 54.4 %
MCH RBC QN AUTO: 27.5 PG (ref 26–33)
MCHC RBC AUTO-ENTMCNC: 32.4 GM/DL (ref 32.2–35.5)
MCV RBC AUTO: 84.8 FL (ref 81–99)
MONOCYTES ABSOLUTE: 0.6 THOU/MM3 (ref 0.4–1.3)
MONOCYTES NFR BLD AUTO: 7.7 %
NEUTROPHILS ABSOLUTE: 2.7 THOU/MM3 (ref 1.8–7.7)
NEUTROPHILS NFR BLD AUTO: 35.4 %
NRBC BLD AUTO-RTO: 0 /100 WBC
NT-PROBNP SERPL IA-MCNC: 36.3 PG/ML (ref 0–124)
OSMOLALITY SERPL CALC.SUM OF ELEC: 275.2 MOSMOL/KG (ref 275–300)
PLATELET # BLD AUTO: 347 THOU/MM3 (ref 130–400)
PMV BLD AUTO: 8.5 FL (ref 9.4–12.4)
POTASSIUM SERPL-SCNC: 3.9 MEQ/L (ref 3.5–5.2)
PROT SERPL-MCNC: 8.4 G/DL (ref 6.1–8)
RBC # BLD AUTO: 4.66 MILL/MM3 (ref 4.2–5.4)
SARS-COV-2 RNA RESP QL NAA+PROBE: NOT DETECTED
SODIUM SERPL-SCNC: 136 MEQ/L (ref 135–145)
TROPONIN, HIGH SENSITIVITY: < 6 NG/L (ref 0–12)
TROPONIN, HIGH SENSITIVITY: < 6 NG/L (ref 0–12)
WBC # BLD AUTO: 7.5 THOU/MM3 (ref 4.8–10.8)

## 2024-11-22 PROCEDURE — 96374 THER/PROPH/DIAG INJ IV PUSH: CPT

## 2024-11-22 PROCEDURE — 71045 X-RAY EXAM CHEST 1 VIEW: CPT

## 2024-11-22 PROCEDURE — 84484 ASSAY OF TROPONIN QUANT: CPT

## 2024-11-22 PROCEDURE — 80053 COMPREHEN METABOLIC PANEL: CPT

## 2024-11-22 PROCEDURE — 2580000003 HC RX 258: Performed by: STUDENT IN AN ORGANIZED HEALTH CARE EDUCATION/TRAINING PROGRAM

## 2024-11-22 PROCEDURE — 83880 ASSAY OF NATRIURETIC PEPTIDE: CPT

## 2024-11-22 PROCEDURE — 93005 ELECTROCARDIOGRAM TRACING: CPT | Performed by: EMERGENCY MEDICINE

## 2024-11-22 PROCEDURE — 6360000002 HC RX W HCPCS

## 2024-11-22 PROCEDURE — 71275 CT ANGIOGRAPHY CHEST: CPT

## 2024-11-22 PROCEDURE — G0378 HOSPITAL OBSERVATION PER HR: HCPCS

## 2024-11-22 PROCEDURE — 6360000004 HC RX CONTRAST MEDICATION: Performed by: STUDENT IN AN ORGANIZED HEALTH CARE EDUCATION/TRAINING PROGRAM

## 2024-11-22 PROCEDURE — 99223 1ST HOSP IP/OBS HIGH 75: CPT | Performed by: STUDENT IN AN ORGANIZED HEALTH CARE EDUCATION/TRAINING PROGRAM

## 2024-11-22 PROCEDURE — 85025 COMPLETE CBC W/AUTO DIFF WBC: CPT

## 2024-11-22 PROCEDURE — 87636 SARSCOV2 & INF A&B AMP PRB: CPT

## 2024-11-22 PROCEDURE — 99285 EMERGENCY DEPT VISIT HI MDM: CPT

## 2024-11-22 PROCEDURE — 93010 ELECTROCARDIOGRAM REPORT: CPT | Performed by: INTERNAL MEDICINE

## 2024-11-22 PROCEDURE — 85379 FIBRIN DEGRADATION QUANT: CPT

## 2024-11-22 PROCEDURE — 36415 COLL VENOUS BLD VENIPUNCTURE: CPT

## 2024-11-22 PROCEDURE — 6360000002 HC RX W HCPCS: Performed by: STUDENT IN AN ORGANIZED HEALTH CARE EDUCATION/TRAINING PROGRAM

## 2024-11-22 PROCEDURE — 96372 THER/PROPH/DIAG INJ SC/IM: CPT

## 2024-11-22 RX ORDER — MAGNESIUM SULFATE IN WATER 40 MG/ML
2000 INJECTION, SOLUTION INTRAVENOUS PRN
Status: DISCONTINUED | OUTPATIENT
Start: 2024-11-22 | End: 2024-11-24 | Stop reason: HOSPADM

## 2024-11-22 RX ORDER — SODIUM CHLORIDE 0.9 % (FLUSH) 0.9 %
5-40 SYRINGE (ML) INJECTION PRN
Status: DISCONTINUED | OUTPATIENT
Start: 2024-11-22 | End: 2024-11-24 | Stop reason: HOSPADM

## 2024-11-22 RX ORDER — ENOXAPARIN SODIUM 100 MG/ML
40 INJECTION SUBCUTANEOUS DAILY
Status: DISCONTINUED | OUTPATIENT
Start: 2024-11-22 | End: 2024-11-22 | Stop reason: DRUGHIGH

## 2024-11-22 RX ORDER — POTASSIUM CHLORIDE 1500 MG/1
40 TABLET, EXTENDED RELEASE ORAL PRN
Status: DISCONTINUED | OUTPATIENT
Start: 2024-11-22 | End: 2024-11-24 | Stop reason: HOSPADM

## 2024-11-22 RX ORDER — METOPROLOL SUCCINATE 25 MG/1
25 TABLET, EXTENDED RELEASE ORAL DAILY
Status: DISCONTINUED | OUTPATIENT
Start: 2024-11-23 | End: 2024-11-24 | Stop reason: HOSPADM

## 2024-11-22 RX ORDER — ENOXAPARIN SODIUM 100 MG/ML
30 INJECTION SUBCUTANEOUS 2 TIMES DAILY
Status: DISCONTINUED | OUTPATIENT
Start: 2024-11-22 | End: 2024-11-24 | Stop reason: HOSPADM

## 2024-11-22 RX ORDER — FUROSEMIDE 10 MG/ML
40 INJECTION INTRAMUSCULAR; INTRAVENOUS ONCE
Status: COMPLETED | OUTPATIENT
Start: 2024-11-22 | End: 2024-11-22

## 2024-11-22 RX ORDER — ONDANSETRON 4 MG/1
4 TABLET, ORALLY DISINTEGRATING ORAL EVERY 8 HOURS PRN
Status: DISCONTINUED | OUTPATIENT
Start: 2024-11-22 | End: 2024-11-24 | Stop reason: HOSPADM

## 2024-11-22 RX ORDER — EZETIMIBE 10 MG/1
10 TABLET ORAL DAILY
Status: DISCONTINUED | OUTPATIENT
Start: 2024-11-23 | End: 2024-11-24 | Stop reason: HOSPADM

## 2024-11-22 RX ORDER — ACETAMINOPHEN 650 MG/1
650 SUPPOSITORY RECTAL EVERY 6 HOURS PRN
Status: DISCONTINUED | OUTPATIENT
Start: 2024-11-22 | End: 2024-11-24 | Stop reason: HOSPADM

## 2024-11-22 RX ORDER — ACETAMINOPHEN 325 MG/1
650 TABLET ORAL EVERY 6 HOURS PRN
Status: DISCONTINUED | OUTPATIENT
Start: 2024-11-22 | End: 2024-11-24 | Stop reason: HOSPADM

## 2024-11-22 RX ORDER — SODIUM CHLORIDE 9 MG/ML
INJECTION, SOLUTION INTRAVENOUS PRN
Status: DISCONTINUED | OUTPATIENT
Start: 2024-11-22 | End: 2024-11-24 | Stop reason: HOSPADM

## 2024-11-22 RX ORDER — SPIRONOLACTONE 25 MG/1
50 TABLET ORAL DAILY
Status: DISCONTINUED | OUTPATIENT
Start: 2024-11-23 | End: 2024-11-24 | Stop reason: HOSPADM

## 2024-11-22 RX ORDER — POLYETHYLENE GLYCOL 3350 17 G/17G
17 POWDER, FOR SOLUTION ORAL DAILY PRN
Status: DISCONTINUED | OUTPATIENT
Start: 2024-11-22 | End: 2024-11-24 | Stop reason: HOSPADM

## 2024-11-22 RX ORDER — IOPAMIDOL 755 MG/ML
80 INJECTION, SOLUTION INTRAVASCULAR
Status: COMPLETED | OUTPATIENT
Start: 2024-11-22 | End: 2024-11-22

## 2024-11-22 RX ORDER — SODIUM CHLORIDE 0.9 % (FLUSH) 0.9 %
5-40 SYRINGE (ML) INJECTION EVERY 12 HOURS SCHEDULED
Status: DISCONTINUED | OUTPATIENT
Start: 2024-11-22 | End: 2024-11-24 | Stop reason: HOSPADM

## 2024-11-22 RX ORDER — ONDANSETRON 2 MG/ML
4 INJECTION INTRAMUSCULAR; INTRAVENOUS EVERY 6 HOURS PRN
Status: DISCONTINUED | OUTPATIENT
Start: 2024-11-22 | End: 2024-11-24 | Stop reason: HOSPADM

## 2024-11-22 RX ORDER — POTASSIUM CHLORIDE 7.45 MG/ML
10 INJECTION INTRAVENOUS PRN
Status: DISCONTINUED | OUTPATIENT
Start: 2024-11-22 | End: 2024-11-22 | Stop reason: RX

## 2024-11-22 RX ORDER — VALSARTAN 40 MG/1
40 TABLET ORAL DAILY
Status: DISCONTINUED | OUTPATIENT
Start: 2024-11-23 | End: 2024-11-24 | Stop reason: HOSPADM

## 2024-11-22 RX ORDER — ENOXAPARIN SODIUM 100 MG/ML
30 INJECTION SUBCUTANEOUS ONCE
Status: DISCONTINUED | OUTPATIENT
Start: 2024-11-22 | End: 2024-11-22

## 2024-11-22 RX ORDER — ASPIRIN 81 MG/1
81 TABLET ORAL DAILY
Status: DISCONTINUED | OUTPATIENT
Start: 2024-11-23 | End: 2024-11-24 | Stop reason: HOSPADM

## 2024-11-22 RX ORDER — ASPIRIN 81 MG/1
324 TABLET, CHEWABLE ORAL ONCE
Status: DISCONTINUED | OUTPATIENT
Start: 2024-11-22 | End: 2024-11-22

## 2024-11-22 RX ADMIN — IOPAMIDOL 80 ML: 755 INJECTION, SOLUTION INTRAVENOUS at 13:56

## 2024-11-22 RX ADMIN — SODIUM CHLORIDE, PRESERVATIVE FREE 10 ML: 5 INJECTION INTRAVENOUS at 19:35

## 2024-11-22 RX ADMIN — ENOXAPARIN SODIUM 30 MG: 100 INJECTION SUBCUTANEOUS at 19:34

## 2024-11-22 RX ADMIN — FUROSEMIDE 40 MG: 10 INJECTION, SOLUTION INTRAMUSCULAR; INTRAVENOUS at 12:51

## 2024-11-22 NOTE — ED NOTES
Pt transferred to [facility]. Pt in stable condition. Floor staff notified.

## 2024-11-22 NOTE — ED NOTES
Patient returned from CT in stable condition. Alerted to ready bed status. Patient can go up by wheelchair

## 2024-11-22 NOTE — ED PROVIDER NOTES
[facility] EMERGENCY DEPARTMENT  EMERGENCY DEPARTMENT ENCOUNTER          Pt Name: Anna Bhakta  MRN: [id number]  Birthdate 1970  Date of evaluation: 2024  Resident Physician: Oz Wong MD EM Resident PGY-3  Attending Physician: Woodrow Puckett DO      CHIEF COMPLAINT       Chief Complaint   Patient presents with    Shortness of Breath    Fatigue         HISTORY OF PRESENT ILLNESS    HPI  Anna Bhakta is a 54 y.o. female who presents to the emergency department from home, as a walk in to the ED [facility] for evaluation of shortness of breath, fatigue.    Patient endorses  4 days of worsening shortness of breath on exertion with associated nausea.  Patient has previous cardiac history for which she established care outpatient with cardiology for CHF states that she has been intermittently compliant with her medications including spironolactone.  Patient denies fever, chest pain, diaphoresis.       The patient has no other acute complaints at this time.    ROS negative except as stated above.    PAST MEDICAL AND SURGICAL HISTORY     Past Medical History:   Diagnosis Date    Heart failure with reduced ejection fraction, NYHA class I ([facility]) 2023    Hypertension     Palpitations      Past Surgical History:   Procedure Laterality Date     SECTION      Has had twice         MEDICATIONS     Current Facility-Administered Medications:     enoxaparin Sodium (LOVENOX) injection 30 mg, 30 mg, SubCUTAneous, Once, Oz Wong MD    aspirin chewable tablet 324 mg, 324 mg, Oral, Once, Oz Wong MD    Current Outpatient Medications:     candesartan (ATACAND) 4 MG tablet, Take 1 tablet by mouth daily, Disp: 90 tablet, Rfl: 3    ezetimibe (ZETIA) 10 MG tablet, Take 1 tablet by mouth daily, Disp: 90 tablet, Rfl: 3    spironolactone (ALDACTONE) 50 MG tablet, Take 1 tablet by mouth daily, Disp: 90 tablet, Rfl: 3    metoprolol succinate (TOPROL XL) 25 MG extended release tablet,

## 2024-11-22 NOTE — ED TRIAGE NOTES
Patient presents with shortness of breath and fatigue for 4 days. States she just feels like this when she's walking. States she does have a heart history. Patient denies being around anyone that has been sick.

## 2024-11-22 NOTE — H&P
Zetia      History Of Present Illness:    Ms. Anna Bhakta is a 54 y.o. female who presents with SOB on exertion ongoing since 4 days.    No SOB at rest. No new cough. No chest pain. No nausea or vomiting. No abdominal pain. No issues with urination or bm. No weight gain, reports weight loss. No swelling of the legs. No orthopnea. No PND.    No ETOH use. No recreational drug use or tobacco use. Significant family cardiac hx in their 40s-50s (brothers).    Hospitalist were contacted for admission given extensive family cardiac history, medication noncompliance and possibly need for ischemic workup.      Past Medical History:        Diagnosis Date    Heart failure with reduced ejection fraction, NYHA class I ([facility]) 2023    Hypertension     Palpitations        Past Surgical History:        Procedure Laterality Date     SECTION      Has had twice       Home Medications:   No current facility-administered medications on file prior to encounter.     Current Outpatient Medications on File Prior to Encounter   Medication Sig Dispense Refill    candesartan (ATACAND) 4 MG tablet Take 1 tablet by mouth daily 90 tablet 3    ezetimibe (ZETIA) 10 MG tablet Take 1 tablet by mouth daily 90 tablet 3    spironolactone (ALDACTONE) 50 MG tablet Take 1 tablet by mouth daily 90 tablet 3    metoprolol succinate (TOPROL XL) 25 MG extended release tablet Take 1 tablet by mouth daily 90 tablet 0    aspirin EC 81 MG EC tablet Take 1 tablet by mouth daily 90 tablet 1       Allergies:    Dust mite extract    Social History:    reports that she has never smoked. She has never been exposed to tobacco smoke. She has never used smokeless tobacco. She reports that she does not currently use alcohol after a past usage of about 1.0 standard drink of alcohol per week. She reports that she does not use drugs.    Family History:       Problem Relation Age of Onset    High Blood Pressure Mother     High Blood Pressure Father     High

## 2024-11-22 NOTE — ED NOTES
ED to inpatient nurses report      Chief Complaint:  Chief Complaint   Patient presents with    Shortness of Breath    Fatigue     Present to ED from: home    MOA:     LOC: alert and orientated to name, place, date  Mobility: Independent  Oxygen Baseline: room air     Current needs required: room air      Code Status:   No Order    What abnormal results were found and what did you give/do to treat them? Shortness of breath  Any procedures or intervention occur? Lasix given.     Mental Status:  Level of Consciousness: Alert (0)    Psych Assessment:        Vitals:  Patient Vitals for the past 24 hrs:   BP Temp Temp src Pulse Resp SpO2   24 1412 -- -- -- 62 15 98 %   24 1245 131/67 -- -- 61 18 96 %   24 1142 -- 97.8 °F (36.6 °C) Oral -- -- --   24 1141 (!) 158/92 -- -- 82 20 98 %        LDAs:   Peripheral IV Left Antecubital (Active)       Ambulatory Status:  No data recorded    Diagnosis:  DISPOSITION Admitted 2024 01:30:17 PM   Final diagnoses:   Dyspnea on exertion        Consults:  None     Pain Score:       C-SSRS:        Sepsis Screening:       Juliet Fall Risk:       Swallow Screening        Preferred Language:   English      ALLERGIES     Dust mite extract    SURGICAL HISTORY       Past Surgical History:   Procedure Laterality Date     SECTION      Has had twice       PAST MEDICAL HISTORY       Past Medical History:   Diagnosis Date    Heart failure with reduced ejection fraction, NYHA class I ([facility]) 2023    Hypertension     Palpitations            Electronically signed by Kesha Renae RN on 2024 at 2:13 PM

## 2024-11-23 PROBLEM — F41.9 ANXIETY: Status: ACTIVE | Noted: 2024-11-23

## 2024-11-23 PROBLEM — I50.22 SYSTOLIC CHF, CHRONIC (HCC): Status: ACTIVE | Noted: 2024-11-23

## 2024-11-23 PROBLEM — R06.09 DYSPNEA ON EXERTION: Status: ACTIVE | Noted: 2024-11-22

## 2024-11-23 PROBLEM — E66.811 OBESITY (BMI 30.0-34.9): Status: ACTIVE | Noted: 2024-11-23

## 2024-11-23 LAB
ANION GAP SERPL CALC-SCNC: 13 MEQ/L (ref 8–16)
BASOPHILS ABSOLUTE: 0 THOU/MM3 (ref 0–0.1)
BASOPHILS NFR BLD AUTO: 0.7 %
BUN SERPL-MCNC: 24 MG/DL (ref 7–22)
CALCIUM SERPL-MCNC: 10 MG/DL (ref 8.5–10.5)
CHLORIDE SERPL-SCNC: 99 MEQ/L (ref 98–111)
CO2 SERPL-SCNC: 22 MEQ/L (ref 23–33)
CREAT SERPL-MCNC: 1 MG/DL (ref 0.4–1.2)
DEPRECATED RDW RBC AUTO: 46.1 FL (ref 35–45)
ECHO BSA: 2.2 M2
EOSINOPHIL NFR BLD AUTO: 2.7 %
EOSINOPHILS ABSOLUTE: 0.2 THOU/MM3 (ref 0–0.4)
ERYTHROCYTE [DISTWIDTH] IN BLOOD BY AUTOMATED COUNT: 14.7 % (ref 11.5–14.5)
GFR SERPL CREATININE-BSD FRML MDRD: 67 ML/MIN/1.73M2
GLUCOSE SERPL-MCNC: 100 MG/DL (ref 70–108)
HCT VFR BLD AUTO: 42.1 % (ref 37–47)
HGB BLD-MCNC: 13.7 GM/DL (ref 12–16)
IMM GRANULOCYTES # BLD AUTO: 0.01 THOU/MM3 (ref 0–0.07)
IMM GRANULOCYTES NFR BLD AUTO: 0.2 %
LYMPHOCYTES ABSOLUTE: 3.1 THOU/MM3 (ref 1–4.8)
LYMPHOCYTES NFR BLD AUTO: 53 %
MCH RBC QN AUTO: 27.8 PG (ref 26–33)
MCHC RBC AUTO-ENTMCNC: 32.5 GM/DL (ref 32.2–35.5)
MCV RBC AUTO: 85.6 FL (ref 81–99)
MONOCYTES ABSOLUTE: 0.5 THOU/MM3 (ref 0.4–1.3)
MONOCYTES NFR BLD AUTO: 8.4 %
NEUTROPHILS ABSOLUTE: 2.1 THOU/MM3 (ref 1.8–7.7)
NEUTROPHILS NFR BLD AUTO: 35 %
NRBC BLD AUTO-RTO: 0 /100 WBC
PLATELET # BLD AUTO: 384 THOU/MM3 (ref 130–400)
PMV BLD AUTO: 9.1 FL (ref 9.4–12.4)
POTASSIUM SERPL-SCNC: 4.3 MEQ/L (ref 3.5–5.2)
RBC # BLD AUTO: 4.92 MILL/MM3 (ref 4.2–5.4)
SODIUM SERPL-SCNC: 134 MEQ/L (ref 135–145)
WBC # BLD AUTO: 5.9 THOU/MM3 (ref 4.8–10.8)

## 2024-11-23 PROCEDURE — 96372 THER/PROPH/DIAG INJ SC/IM: CPT

## 2024-11-23 PROCEDURE — 36415 COLL VENOUS BLD VENIPUNCTURE: CPT

## 2024-11-23 PROCEDURE — G0378 HOSPITAL OBSERVATION PER HR: HCPCS

## 2024-11-23 PROCEDURE — 6360000002 HC RX W HCPCS: Performed by: INTERNAL MEDICINE

## 2024-11-23 PROCEDURE — 6360000004 HC RX CONTRAST MEDICATION: Performed by: INTERNAL MEDICINE

## 2024-11-23 PROCEDURE — 2709999900 HC NON-CHARGEABLE SUPPLY: Performed by: INTERNAL MEDICINE

## 2024-11-23 PROCEDURE — C1887 CATHETER, GUIDING: HCPCS | Performed by: INTERNAL MEDICINE

## 2024-11-23 PROCEDURE — 2580000003 HC RX 258: Performed by: INTERNAL MEDICINE

## 2024-11-23 PROCEDURE — 93458 L HRT ARTERY/VENTRICLE ANGIO: CPT | Performed by: INTERNAL MEDICINE

## 2024-11-23 PROCEDURE — C1894 INTRO/SHEATH, NON-LASER: HCPCS | Performed by: INTERNAL MEDICINE

## 2024-11-23 PROCEDURE — 2500000003 HC RX 250 WO HCPCS: Performed by: INTERNAL MEDICINE

## 2024-11-23 PROCEDURE — 2580000003 HC RX 258: Performed by: STUDENT IN AN ORGANIZED HEALTH CARE EDUCATION/TRAINING PROGRAM

## 2024-11-23 PROCEDURE — 99233 SBSQ HOSP IP/OBS HIGH 50: CPT | Performed by: NURSE PRACTITIONER

## 2024-11-23 PROCEDURE — 99152 MOD SED SAME PHYS/QHP 5/>YRS: CPT | Performed by: INTERNAL MEDICINE

## 2024-11-23 PROCEDURE — 80048 BASIC METABOLIC PNL TOTAL CA: CPT

## 2024-11-23 PROCEDURE — C1769 GUIDE WIRE: HCPCS | Performed by: INTERNAL MEDICINE

## 2024-11-23 PROCEDURE — 6360000002 HC RX W HCPCS: Performed by: STUDENT IN AN ORGANIZED HEALTH CARE EDUCATION/TRAINING PROGRAM

## 2024-11-23 PROCEDURE — 85025 COMPLETE CBC W/AUTO DIFF WBC: CPT

## 2024-11-23 PROCEDURE — 6370000000 HC RX 637 (ALT 250 FOR IP): Performed by: STUDENT IN AN ORGANIZED HEALTH CARE EDUCATION/TRAINING PROGRAM

## 2024-11-23 RX ORDER — SODIUM CHLORIDE 0.9 % (FLUSH) 0.9 %
5-40 SYRINGE (ML) INJECTION PRN
Status: DISCONTINUED | OUTPATIENT
Start: 2024-11-23 | End: 2024-11-23 | Stop reason: HOSPADM

## 2024-11-23 RX ORDER — ACETAMINOPHEN 325 MG/1
650 TABLET ORAL EVERY 4 HOURS PRN
Status: DISCONTINUED | OUTPATIENT
Start: 2024-11-23 | End: 2024-11-24 | Stop reason: HOSPADM

## 2024-11-23 RX ORDER — SODIUM CHLORIDE 9 MG/ML
INJECTION, SOLUTION INTRAVENOUS PRN
Status: DISCONTINUED | OUTPATIENT
Start: 2024-11-23 | End: 2024-11-24 | Stop reason: HOSPADM

## 2024-11-23 RX ORDER — SODIUM CHLORIDE 0.9 % (FLUSH) 0.9 %
5-40 SYRINGE (ML) INJECTION EVERY 12 HOURS SCHEDULED
Status: DISCONTINUED | OUTPATIENT
Start: 2024-11-23 | End: 2024-11-24 | Stop reason: HOSPADM

## 2024-11-23 RX ORDER — SODIUM CHLORIDE 9 MG/ML
INJECTION, SOLUTION INTRAVENOUS PRN
Status: DISCONTINUED | OUTPATIENT
Start: 2024-11-23 | End: 2024-11-23 | Stop reason: HOSPADM

## 2024-11-23 RX ORDER — SODIUM CHLORIDE 0.9 % (FLUSH) 0.9 %
5-40 SYRINGE (ML) INJECTION EVERY 12 HOURS SCHEDULED
Status: DISCONTINUED | OUTPATIENT
Start: 2024-11-23 | End: 2024-11-23 | Stop reason: HOSPADM

## 2024-11-23 RX ORDER — MIDAZOLAM HYDROCHLORIDE 1 MG/ML
INJECTION, SOLUTION INTRAMUSCULAR; INTRAVENOUS PRN
Status: DISCONTINUED | OUTPATIENT
Start: 2024-11-23 | End: 2024-11-23 | Stop reason: HOSPADM

## 2024-11-23 RX ORDER — ALPRAZOLAM 0.25 MG/1
0.25 TABLET ORAL ONCE
Status: DISCONTINUED | OUTPATIENT
Start: 2024-11-23 | End: 2024-11-24 | Stop reason: HOSPADM

## 2024-11-23 RX ORDER — FENTANYL CITRATE 50 UG/ML
INJECTION, SOLUTION INTRAMUSCULAR; INTRAVENOUS PRN
Status: DISCONTINUED | OUTPATIENT
Start: 2024-11-23 | End: 2024-11-23 | Stop reason: HOSPADM

## 2024-11-23 RX ORDER — ATROPINE SULFATE 0.4 MG/ML
0.5 INJECTION, SOLUTION INTRAVENOUS
Status: DISCONTINUED | OUTPATIENT
Start: 2024-11-23 | End: 2024-11-24 | Stop reason: HOSPADM

## 2024-11-23 RX ORDER — IOPAMIDOL 755 MG/ML
INJECTION, SOLUTION INTRAVASCULAR PRN
Status: DISCONTINUED | OUTPATIENT
Start: 2024-11-23 | End: 2024-11-23 | Stop reason: HOSPADM

## 2024-11-23 RX ORDER — SODIUM CHLORIDE 0.9 % (FLUSH) 0.9 %
5-40 SYRINGE (ML) INJECTION PRN
Status: DISCONTINUED | OUTPATIENT
Start: 2024-11-23 | End: 2024-11-24 | Stop reason: HOSPADM

## 2024-11-23 RX ORDER — SODIUM CHLORIDE 9 MG/ML
INJECTION, SOLUTION INTRAVENOUS CONTINUOUS
Status: DISCONTINUED | OUTPATIENT
Start: 2024-11-23 | End: 2024-11-24

## 2024-11-23 RX ADMIN — SODIUM CHLORIDE: 9 INJECTION, SOLUTION INTRAVENOUS at 18:50

## 2024-11-23 RX ADMIN — ENOXAPARIN SODIUM 30 MG: 100 INJECTION SUBCUTANEOUS at 08:05

## 2024-11-23 RX ADMIN — EZETIMIBE 10 MG: 10 TABLET ORAL at 08:05

## 2024-11-23 RX ADMIN — ENOXAPARIN SODIUM 30 MG: 100 INJECTION SUBCUTANEOUS at 20:05

## 2024-11-23 RX ADMIN — SODIUM CHLORIDE, PRESERVATIVE FREE 10 ML: 5 INJECTION INTRAVENOUS at 08:06

## 2024-11-23 RX ADMIN — ASPIRIN 81 MG: 81 TABLET, COATED ORAL at 08:06

## 2024-11-23 RX ADMIN — METOPROLOL SUCCINATE 25 MG: 25 TABLET, FILM COATED, EXTENDED RELEASE ORAL at 08:05

## 2024-11-23 RX ADMIN — VALSARTAN 40 MG: 40 TABLET ORAL at 08:05

## 2024-11-23 RX ADMIN — SPIRONOLACTONE 50 MG: 25 TABLET ORAL at 08:05

## 2024-11-23 NOTE — PLAN OF CARE
Problem: Discharge Planning  Goal: Discharge to home or other facility with appropriate resources  11/22/2024 2135 by Deshawn Del Rosario, RN  Outcome: Progressing  11/22/2024 1831 by Le Naqvi, RN  Outcome: Progressing

## 2024-11-23 NOTE — CONSULTS
damage/failure, myocardial perforation, allergic reactions to sedatives/contrast material, loss of pulse/vascular compromise requiring surgery, aneurysm/pseudoaneurysm formation, possible loss of a limb/hand/leg, death. Alternatives to and omission of the suggested procedure were discussed. The patient also understands the need for DAPT if PCI is necessary. The patient had no further questions and wished to proceed    Above findings and plan of care were discussed with patient, questions were answered, agreeable to plan.     Thank you for allowing me to participate in the care of this patient.  Please let me know if I can be of any further assistance.      Marlin Redd MD, FACC, [facility]   9:40 AM  11/23/2024

## 2024-11-23 NOTE — PRE SEDATION
10 mL at 11/23/24 0806    sodium chloride flush 0.9 % injection 5-40 mL, 5-40 mL, IntraVENous, PRN, Behl, Ashita, MD    0.9 % sodium chloride infusion, , IntraVENous, PRN, Behl, Ashita, MD    potassium chloride (KLOR-CON M) extended release tablet 40 mEq, 40 mEq, Oral, PRN **OR** potassium bicarb-citric acid (EFFER-K) effervescent tablet 40 mEq, 40 mEq, Oral, PRN **OR** [DISCONTINUED] potassium chloride 10 mEq/100 mL IVPB (Peripheral Line), 10 mEq, IntraVENous, PRN, Behl, Ashita, MD    magnesium sulfate 2000 mg in 50 mL IVPB premix, 2,000 mg, IntraVENous, PRN, Behl, Ashita, MD    ondansetron (ZOFRAN-ODT) disintegrating tablet 4 mg, 4 mg, Oral, Q8H PRN **OR** ondansetron (ZOFRAN) injection 4 mg, 4 mg, IntraVENous, Q6H PRN, Behl, Ashita, MD    polyethylene glycol (GLYCOLAX) packet 17 g, 17 g, Oral, Daily PRN, Behl, Ashita, MD    acetaminophen (TYLENOL) tablet 650 mg, 650 mg, Oral, Q6H PRN **OR** acetaminophen (TYLENOL) suppository 650 mg, 650 mg, Rectal, Q6H PRN, Behl, Ashita, MD    enoxaparin Sodium (LOVENOX) injection 30 mg, 30 mg, SubCUTAneous, BID, Behl, Ashita, MD, 30 mg at 11/23/24 0805  Prior to Admission medications    Medication Sig Start Date End Date Taking? Authorizing Provider   candesartan (ATACAND) 4 MG tablet Take 1 tablet by mouth daily 10/23/24   Brendon Connor DO   ezetimibe (ZETIA) 10 MG tablet Take 1 tablet by mouth daily 10/23/24   Brendon Connor DO   spironolactone (ALDACTONE) 50 MG tablet Take 1 tablet by mouth daily 10/23/24   Brendon Connor DO   metoprolol succinate (TOPROL XL) 25 MG extended release tablet Take 1 tablet by mouth daily 10/23/24   Brendon Connor DO   aspirin EC 81 MG EC tablet Take 1 tablet by mouth daily 2/9/23   Hal High MD     Additional information:       VITAL SIGNS   Vitals:    11/23/24 0815   BP: 121/83   Pulse: 77   Resp: 16   Temp: 97.8 °F (36.6 °C)   SpO2: 94%       PHYSICAL:   General: No acute distress  HEENT:  Unremarkable for

## 2024-11-24 VITALS
DIASTOLIC BLOOD PRESSURE: 71 MMHG | RESPIRATION RATE: 16 BRPM | TEMPERATURE: 98.4 F | BODY MASS INDEX: 33.77 KG/M2 | WEIGHT: 222.8 LBS | HEIGHT: 68 IN | OXYGEN SATURATION: 97 % | HEART RATE: 68 BPM | SYSTOLIC BLOOD PRESSURE: 127 MMHG

## 2024-11-24 LAB
ANION GAP SERPL CALC-SCNC: 11 MEQ/L (ref 8–16)
BASOPHILS ABSOLUTE: 0 THOU/MM3 (ref 0–0.1)
BASOPHILS NFR BLD AUTO: 0.4 %
BUN SERPL-MCNC: 26 MG/DL (ref 7–22)
CALCIUM SERPL-MCNC: 9.6 MG/DL (ref 8.5–10.5)
CHLORIDE SERPL-SCNC: 103 MEQ/L (ref 98–111)
CO2 SERPL-SCNC: 23 MEQ/L (ref 23–33)
CREAT SERPL-MCNC: 0.9 MG/DL (ref 0.4–1.2)
DEPRECATED RDW RBC AUTO: 45.4 FL (ref 35–45)
EOSINOPHIL NFR BLD AUTO: 1.9 %
EOSINOPHILS ABSOLUTE: 0.1 THOU/MM3 (ref 0–0.4)
ERYTHROCYTE [DISTWIDTH] IN BLOOD BY AUTOMATED COUNT: 14.7 % (ref 11.5–14.5)
GFR SERPL CREATININE-BSD FRML MDRD: 76 ML/MIN/1.73M2
GLUCOSE SERPL-MCNC: 91 MG/DL (ref 70–108)
HCT VFR BLD AUTO: 39.4 % (ref 37–47)
HGB BLD-MCNC: 12.8 GM/DL (ref 12–16)
IMM GRANULOCYTES # BLD AUTO: 0.02 THOU/MM3 (ref 0–0.07)
IMM GRANULOCYTES NFR BLD AUTO: 0.3 %
LYMPHOCYTES ABSOLUTE: 3.9 THOU/MM3 (ref 1–4.8)
LYMPHOCYTES NFR BLD AUTO: 56.8 %
MCH RBC QN AUTO: 27.6 PG (ref 26–33)
MCHC RBC AUTO-ENTMCNC: 32.5 GM/DL (ref 32.2–35.5)
MCV RBC AUTO: 85.1 FL (ref 81–99)
MONOCYTES ABSOLUTE: 0.5 THOU/MM3 (ref 0.4–1.3)
MONOCYTES NFR BLD AUTO: 7.3 %
NEUTROPHILS ABSOLUTE: 2.3 THOU/MM3 (ref 1.8–7.7)
NEUTROPHILS NFR BLD AUTO: 33.3 %
NRBC BLD AUTO-RTO: 0 /100 WBC
PLATELET # BLD AUTO: 358 THOU/MM3 (ref 130–400)
PMV BLD AUTO: 9 FL (ref 9.4–12.4)
POTASSIUM SERPL-SCNC: 4.3 MEQ/L (ref 3.5–5.2)
RBC # BLD AUTO: 4.63 MILL/MM3 (ref 4.2–5.4)
SODIUM SERPL-SCNC: 137 MEQ/L (ref 135–145)
WBC # BLD AUTO: 6.8 THOU/MM3 (ref 4.8–10.8)

## 2024-11-24 PROCEDURE — 2580000003 HC RX 258: Performed by: STUDENT IN AN ORGANIZED HEALTH CARE EDUCATION/TRAINING PROGRAM

## 2024-11-24 PROCEDURE — 99239 HOSP IP/OBS DSCHRG MGMT >30: CPT | Performed by: NURSE PRACTITIONER

## 2024-11-24 PROCEDURE — 96372 THER/PROPH/DIAG INJ SC/IM: CPT

## 2024-11-24 PROCEDURE — 80048 BASIC METABOLIC PNL TOTAL CA: CPT

## 2024-11-24 PROCEDURE — G0378 HOSPITAL OBSERVATION PER HR: HCPCS

## 2024-11-24 PROCEDURE — 2580000003 HC RX 258: Performed by: INTERNAL MEDICINE

## 2024-11-24 PROCEDURE — 36415 COLL VENOUS BLD VENIPUNCTURE: CPT

## 2024-11-24 PROCEDURE — 85025 COMPLETE CBC W/AUTO DIFF WBC: CPT

## 2024-11-24 PROCEDURE — 6360000002 HC RX W HCPCS: Performed by: STUDENT IN AN ORGANIZED HEALTH CARE EDUCATION/TRAINING PROGRAM

## 2024-11-24 PROCEDURE — 6370000000 HC RX 637 (ALT 250 FOR IP): Performed by: STUDENT IN AN ORGANIZED HEALTH CARE EDUCATION/TRAINING PROGRAM

## 2024-11-24 RX ADMIN — POLYETHYLENE GLYCOL 3350 17 G: 17 POWDER, FOR SOLUTION ORAL at 04:11

## 2024-11-24 RX ADMIN — ENOXAPARIN SODIUM 30 MG: 100 INJECTION SUBCUTANEOUS at 08:00

## 2024-11-24 RX ADMIN — VALSARTAN 40 MG: 40 TABLET ORAL at 07:50

## 2024-11-24 RX ADMIN — SPIRONOLACTONE 50 MG: 25 TABLET ORAL at 07:50

## 2024-11-24 RX ADMIN — ASPIRIN 81 MG: 81 TABLET, COATED ORAL at 08:00

## 2024-11-24 RX ADMIN — METOPROLOL SUCCINATE 25 MG: 25 TABLET, FILM COATED, EXTENDED RELEASE ORAL at 07:50

## 2024-11-24 RX ADMIN — SODIUM CHLORIDE, PRESERVATIVE FREE 10 ML: 5 INJECTION INTRAVENOUS at 07:51

## 2024-11-24 RX ADMIN — EZETIMIBE 10 MG: 10 TABLET ORAL at 08:00

## 2024-11-24 RX ADMIN — SODIUM CHLORIDE, PRESERVATIVE FREE 10 ML: 5 INJECTION INTRAVENOUS at 07:50

## 2024-11-24 NOTE — PROGRESS NOTES
Pharmacist Review and Automatic Dose Adjustment of Prophylactic Enoxaparin    The reviewing pharmacist has made an adjustment to the ordered enoxaparin dose or converted to UFH per the approved [facility] protocol and table as identified below.      Anna Bhakta is a 54 y.o. female.     Recent Labs     11/22/24  1152   CREATININE 1.0     Estimated Creatinine Clearance: 80 mL/min (based on SCr of 1 mg/dL).    Recent Labs     11/22/24  1152   HGB 12.8   HCT 39.5        No results for input(s): \"INR\" in the last 72 hours.    Height:   Ht Readings from Last 1 Encounters:   11/18/24 1.727 m (5' 8\")     Weight:  Wt Readings from Last 1 Encounters:   11/18/24 101.1 kg (222 lb 12.8 oz)         Plan: Based upon the patient's weight and renal function    Ordered: Enoxaparin 40mg SUBQ Daily    Changed/converted to    New Order: Enoxaparin 30mg SUBQ BID    Thank you,    Brooke Pearce, [facility]  11/22/2024, 2:30 PM  
Cardiology Progress Note      Patient:  Anna Bhakta  YOB: 1970  MRN: [id number]   Acct: [id number]  Admit Date:  11/22/2024  Primary Cardiologist: Temo Butler MD  Note per Dr soliman:  \"REASON FOR CONSULT:    recent decline in ef to 40-45%, any ischemic workup warranted inpatient          CHIEF COMPLAINT:    Dyspnea on exertion      HISTORY OF PRESENT ILLNESS:    Anna Bhakta is a pleasant 54 year old female patient with past medical history that includes:   Past Medical History        Past Medical History:   Diagnosis Date    Heart failure with reduced ejection fraction, NYHA class I ([facility]) 1/18/2023    Hypertension      Palpitations        Her FH is positive for CAD, her borther had MI at age of 40 and another brother had MI at age 50. Patient has been evaluated by Dr Butler in office for palpitations. Apparently, she was on Amiodarone for unclear reason at that time which was stopped per Dr Butler. Cardiac event monitor 1/2023 revealed NSR, NSVT, no sustained arrhythmia. The patient was admitted to the hospital on 11/22/2024 with dyspnea on exertion and shortness of breath. She was give IV Lasix in ER. Patient denies chest pain, orthopnea, paroxysmal nocturnal dyspnea, dizziness, syncope, recent weight gain or leg swelling. Reports palpitations on occasions. Has fatigue. Echocardiogram on 11/4/2024 revealed an EF of 40-45%, mild MR. Patient denies having prior ischemic evaluation (no stress test or heat cath in the past). EKG reveled SR, nonspecific ST/TW changes. HS Troponin <6, <6. Chest CTA was negative for pulmonary embolism.      All labs, EKG's, diagnostic testing and images as well as cardiac cath, stress testing   were reviewed during this encounter\"    Subjective (Events in last 24 hours):   Pt awake, alert. NAD. Denies cp or sob today. Feeling better but tired. D/w her about cath and echo findings.     Right radial-no ecchymossi, nontender, no edema, NVI   Objective:   BP 
Patient called me to room to inform me that she did not wish to proceed with scheduled LHC this afternoon. Patient states she is anxious about procedure.I advised that the physician would discuss procedure and reasoning for it, and that anxiety medication could be provided. Patient continues to decline interventions, Cath Lab notified.  
insight  Capillary Refill: Brisk,< 3 seconds   Peripheral Pulses: +2 palpable, equal bilaterally       Labs:   Recent Labs     11/22/24  1152 11/23/24  0545   WBC 7.5 5.9   HGB 12.8 13.7   HCT 39.5 42.1    384     Recent Labs     11/22/24  1152 11/23/24  0545    134*   K 3.9 4.3    99   CO2 25 22*   BUN 24* 24*   CREATININE 1.0 1.0   CALCIUM 9.8 10.0     Recent Labs     11/22/24  1152   AST 23   ALT 19   BILITOT 0.2*   ALKPHOS 84     No results for input(s): \"INR\" in the last 72 hours.  No results for input(s): \"CKTOTAL\", \"TROPONINI\" in the last 72 hours.    Urinalysis:    No results found for: \"NITRU\", \"WBCUA\", \"BACTERIA\", \"RBCUA\", \"BLOODU\", \"SPECGRAV\", \"GLUCOSEU\"    Radiology:  CTA CHEST W WO CONTRAST   Final Result   1. No pulmonary embolism.   2. No acute intrathoracic findings.            **This report has been created using voice recognition software. It may contain   minor errors which are inherent in voice recognition technology.**      Electronically signed by Dr. Turner Encarnacion      XR CHEST PORTABLE   Final Result   1. No acute cardiopulmonary finding..               **This report has been created using voice recognition software.  It may contain   minor errors which are inherent in voice recognition technology.**      Electronically signed by Dr. Monica Hill          Diet: Diet NPO Exceptions are: Sips of Water with Meds    DVT prophylaxis: [] Lovenox                                 [x] SCDs                                 [] SQ Heparin                                 [] Encourage ambulation           [] Already on Anticoagulation     Disposition:    [x] Home       [] TCU       [] Rehab       [] Psych       [] SNF       [] Long Term Care Facility       [] Other-    Code Status: Full Code    PT/OT Eval Status: no        Electronically signed by LIT Gomes CNP on 11/23/2024 at 11:59 AM

## 2024-11-24 NOTE — DISCHARGE SUMMARY
Hospital Medicine Discharge Summary      Patient Identification:   Anna Bhakta   : 1970  MRN: [id number]   Account: [id number]      Patient's PCP: Virginia Lindsey MD    Admit Date: 2024     Discharge Date:   2024    Admitting Physician: Behl, Ashita, MD      Discharge Physician: Kisha Martines, LIT - CNP     Discharge Diagnoses and Hospital Course:    Presented to the ED with SOB.    Dyspnea on exertion:  ACS ruled out. CTA negative for PE. Cardiology seen. C  with no CAD, tortuous vessels noted  Isolated anxiety, resolved. Given Xanax x 1 PO .  Cardiomyopathy/HFrEF: TTE on 2024 with EF of 40 to 45% with moderately reduced left ventricular systolic function.  Mild mitral valve regurgitation.  Mild tricuspid valve regurgitation.  TTE on 2023 with a EF of 45%.  No active signs of volume overload.  Chest x-ray negative.  BNP of 36.3. Given Lasix 40 mg IV at ER, no further needs for diuretics given no signs or symptoms of volume overload. Strict intake and output. Daily weights. Continue home asa, metoprolol, spironolactone, valsartan and Zetia. Cardiology recommends to follow up in 6 months for repeat echocardiogram.   Primary Hypertension: Continue home antihypertensives with hold parameters.  Hyperlipidemia: continue home Zetia  Obesity. BMI 33.88.    Discussed case with cardiology. Cleared for discharge home with OP follow up with Dr. Butler.     The patient was seen and examined on day of discharge and this discharge summary is in conjunction with any daily progress note from day of discharge.        Exam:     Vitals:  Vitals:    24 2355 24 0425 24 0430 24 0753   BP: 107/70 129/72  127/71   Pulse: 78 59  68   Resp: 14 16  16   Temp: 97.6 °F (36.4 °C) 98 °F (36.7 °C) 98 °F (36.7 °C) 98.4 °F (36.9 °C)   TempSrc: Oral Oral Oral Oral   SpO2: 97% 97%  97%   Weight:       Height:         Weight: Weight - Scale: 101.1 kg (222 lb

## 2024-11-24 NOTE — DISCHARGE INSTRUCTIONS
F/u with Dr Butler's office.     Discharge Instructions for Radial Heart Catherization    1.  Take it easy for 3-4 days.  2.  No driving for 2 days.  3.  No lifting of 5 lbs or more for 5 days with the affected arm.  4.  Can shower after 24 hours.  5.  Remove arm board after 24 hours.  6.  Apply a band aid to the insertion site daily for 5 days.  May apply antibiotic ointment if desired, but not necessary.  Wash site daily with soap and water.  7.  No creams, ointments, or powders near the insertion site.   8.  No tub baths, swimming, hot tubs, or hand washing dishes for 1 week.  9.  Watch for signs of infection (redness, warmth, swelling, or pus drainage) or coolness of extremity and call physician if this occurs  10.  If bleeding occurs from insertion site, apply pressure and call 911.

## 2024-12-04 ENCOUNTER — OFFICE VISIT (OUTPATIENT)
Dept: INTERNAL MEDICINE CLINIC | Age: 54
End: 2024-12-04

## 2024-12-04 VITALS
HEART RATE: 80 BPM | BODY MASS INDEX: 34.28 KG/M2 | OXYGEN SATURATION: 97 % | WEIGHT: 226.2 LBS | SYSTOLIC BLOOD PRESSURE: 130 MMHG | HEIGHT: 68 IN | DIASTOLIC BLOOD PRESSURE: 64 MMHG

## 2024-12-04 DIAGNOSIS — I77.1 TORTUOUS ARTERY (HCC): ICD-10-CM

## 2024-12-04 DIAGNOSIS — I50.22 HEART FAILURE WITH MID-RANGE EJECTION FRACTION (HCC): Primary | ICD-10-CM

## 2024-12-04 DIAGNOSIS — R73.03 PREDIABETES: ICD-10-CM

## 2024-12-04 DIAGNOSIS — I10 PRIMARY HYPERTENSION: ICD-10-CM

## 2024-12-04 DIAGNOSIS — R40.0 SOMNOLENCE, DAYTIME: ICD-10-CM

## 2024-12-04 DIAGNOSIS — G47.30 SLEEP APNEA, UNSPECIFIED TYPE: ICD-10-CM

## 2024-12-04 RX ORDER — METOPROLOL SUCCINATE 25 MG/1
25 TABLET, EXTENDED RELEASE ORAL DAILY
Qty: 90 TABLET | Refills: 3 | Status: CANCELLED | OUTPATIENT
Start: 2024-12-04

## 2024-12-04 RX ORDER — SPIRONOLACTONE 50 MG/1
50 TABLET, FILM COATED ORAL 2 TIMES DAILY
Qty: 90 TABLET | Refills: 3 | Status: SHIPPED | OUTPATIENT
Start: 2024-12-04

## 2024-12-04 NOTE — PROGRESS NOTES
[facility]   [address]  [place] [address]  Dept: [phone]  Dept Fax: [phone]    Chief complaint(s):  Hypertension, Congestive Heart Failure, Hyperlipidemia, lv dysfunction, Fatigue, Headache, and Dizziness    ASSESSMENT AND PLAN     1. Heart failure with mid-range ejection fraction, NYHA I (HCC)    2. Primary hypertension    3. Tortuous artery (HCC)    4. Sleep apnea, unspecified type    5. Somnolence, daytime    6. Prediabetes        Patient noted to have 15 points on sleep score.  Will get baseline sleep study.  Patient may need referral to pulmonology for CPAP evaluation.  We increased her spironolactone to combat symptoms of heart failure.  Patient  Given her torturous vessels in her heart, we discussed the importance of managing multiple comorbidities at the risk of possibly needing PCI which could lead to further complications given the difficulty of stenting.  Patient is required to follow-up with cardiology and will continue to manage multiple comorbidities.  Refills for her medication were prescribed  Patient to follow-up in 6 weeks with to her blood pressure readings at home and recorded.  She will also start weighing herself every day to avoid excess weight gain from volume overload  Will get basic blood work which she declined in the past however given hospitalization she is now more open to it.  Patient has declined any vaccinations at this time    Orders Placed This Encounter   Procedures    TSH    T4, Free    Basic Metabolic Panel    Hemoglobin A1C    Baseline Diagnostic Sleep Study     New Prescriptions    No medications on file       The risks and benefits of my recommendations, as well as other treatment options (if indicated) were discussed with the patient and  today. Questions were answered.  Follow up: 6 weeks and as needed.    [place]     Anna Bhakta (1970) is a pleasant 54 y.o. female here for a follow up visit for

## 2025-01-14 NOTE — PROGRESS NOTES
Patient:  Anna Bhakta                  Unit/Bed:  Room/bed info not found   YOB: 1970   MRN:  [id number]      1970    Chief Complaint   Patient presents with    Congestive Heart Failure    Hypertension    Sleep Apnea     Pt is a 54 y.o. female who presents for 6 week follow up for blood pressure management. She is currently on toprol xl 25mg daily. Her blood pressure today is well controlled 128/86. She did not complete the recommended labs ordered by Dr Connor six weeks ago, because she had forgotten them. She states that she has been feeling well, taking her medications as prescribed, and she brings her home blood pressure records over the past several weeks. She reports and it is documented that patient's systolic blood pressure averages in the mid-120s and her diastolic blood pressure appears to typically range in the 70s to low 80s. She denies any headaches, vision changes, dizziness/lightheadedness, chest pain/pressure/tightness, palpitations, dyspnea, abdominal pain, changes in bowels/bladder, skin changes, or new edema.     Past Medical History:   Diagnosis Date    Heart failure with reduced ejection fraction, NYHA class I ([facility]) 2023    Hypertension     Palpitations        Past Surgical History:   Procedure Laterality Date    CARDIAC PROCEDURE N/A 2024    Left heart cath / coronary angiography performed by Marlin Redd MD at [facility] CARDIAC CATH LAB     SECTION      Has had twice       Current Outpatient Medications   Medication Sig Dispense Refill    metoprolol succinate (TOPROL XL) 25 MG extended release tablet Take 1 tablet by mouth daily 90 tablet 0    aspirin 81 MG EC tablet Take 1 tablet by mouth daily 90 tablet 3    spironolactone (ALDACTONE) 50 MG tablet Take 1 tablet by mouth 2 times daily 90 tablet 3    candesartan (ATACAND) 4 MG tablet Take 1 tablet by mouth daily 90 tablet 3    ezetimibe (ZETIA) 10 MG tablet Take 1 tablet by mouth daily 90 tablet

## 2025-01-20 ENCOUNTER — OFFICE VISIT (OUTPATIENT)
Dept: INTERNAL MEDICINE CLINIC | Age: 55
End: 2025-01-20

## 2025-01-20 VITALS
BODY MASS INDEX: 33.68 KG/M2 | WEIGHT: 222.2 LBS | HEIGHT: 68 IN | OXYGEN SATURATION: 98 % | DIASTOLIC BLOOD PRESSURE: 86 MMHG | SYSTOLIC BLOOD PRESSURE: 128 MMHG | HEART RATE: 86 BPM

## 2025-01-20 DIAGNOSIS — E66.811 OBESITY (BMI 30.0-34.9): ICD-10-CM

## 2025-01-20 DIAGNOSIS — I10 PRIMARY HYPERTENSION: Primary | ICD-10-CM

## 2025-01-20 DIAGNOSIS — I50.22 HEART FAILURE WITH MID-RANGE EJECTION FRACTION (HCC): ICD-10-CM

## 2025-01-20 DIAGNOSIS — R73.03 PRE-DIABETES: ICD-10-CM

## 2025-01-20 RX ORDER — ASPIRIN 81 MG/1
81 TABLET ORAL DAILY
Qty: 90 TABLET | Refills: 3 | Status: SHIPPED | OUTPATIENT
Start: 2025-01-20

## 2025-01-20 RX ORDER — METOPROLOL SUCCINATE 25 MG/1
25 TABLET, EXTENDED RELEASE ORAL DAILY
Qty: 90 TABLET | Refills: 0 | Status: SHIPPED | OUTPATIENT
Start: 2025-01-20

## 2025-01-20 ASSESSMENT — PATIENT HEALTH QUESTIONNAIRE - PHQ9
SUM OF ALL RESPONSES TO PHQ9 QUESTIONS 1 & 2: 0
SUM OF ALL RESPONSES TO PHQ QUESTIONS 1-9: 0
1. LITTLE INTEREST OR PLEASURE IN DOING THINGS: NOT AT ALL
SUM OF ALL RESPONSES TO PHQ QUESTIONS 1-9: 0
2. FEELING DOWN, DEPRESSED OR HOPELESS: NOT AT ALL

## 2025-01-23 NOTE — ASSESSMENT & PLAN NOTE
TSH and fT4 prior to next visit in 6 weeks  Encouraged healthy lifestyle modifications to activity level and diet.

## 2025-01-23 NOTE — ASSESSMENT & PLAN NOTE
HbA1c and BMP prior to next visit in 6 weeks.   Consider SGLT2 inhibitor in setting of known heart failure pending further discussion at next visit.

## 2025-01-23 NOTE — ASSESSMENT & PLAN NOTE
Follows with cardiology. Last appointment 11/24/2024 with Connor Mortimer. Repeat echo scheduled for 5/5/2025 with follow up appointment with Dr Butler on 5/12/2025. Echo on 11/4/2024 showed moderately reduced LV systolic function 40-45%, mild valvular disease. Prior echo had also shown mild global hypokinesis of the LV.  Metoprolol refilled today  Continue spironolactone  Consider ACEI/ARB/ARNI and/or SGLT2 inhibitor pending clinical course and further recommendations by cardiologist.

## 2025-02-12 ENCOUNTER — TELEPHONE (OUTPATIENT)
Dept: INTERNAL MEDICINE CLINIC | Age: 55
End: 2025-02-12

## 2025-04-16 ENCOUNTER — OFFICE VISIT (OUTPATIENT)
Dept: INTERNAL MEDICINE CLINIC | Age: 55
End: 2025-04-16
Payer: COMMERCIAL

## 2025-04-16 VITALS
SYSTOLIC BLOOD PRESSURE: 139 MMHG | OXYGEN SATURATION: 99 % | HEIGHT: 68 IN | DIASTOLIC BLOOD PRESSURE: 68 MMHG | WEIGHT: 226 LBS | BODY MASS INDEX: 34.25 KG/M2 | HEART RATE: 70 BPM

## 2025-04-16 DIAGNOSIS — R73.9 HYPERGLYCEMIA: ICD-10-CM

## 2025-04-16 DIAGNOSIS — I10 PRIMARY HYPERTENSION: Primary | ICD-10-CM

## 2025-04-16 DIAGNOSIS — I50.22 HEART FAILURE WITH MID-RANGE EJECTION FRACTION (HCC): ICD-10-CM

## 2025-04-16 LAB — HBA1C MFR BLD: 5.8 % (ref 4.3–5.7)

## 2025-04-16 PROCEDURE — 3075F SYST BP GE 130 - 139MM HG: CPT

## 2025-04-16 PROCEDURE — 83036 HEMOGLOBIN GLYCOSYLATED A1C: CPT

## 2025-04-16 PROCEDURE — 3078F DIAST BP <80 MM HG: CPT

## 2025-04-16 PROCEDURE — 99213 OFFICE O/P EST LOW 20 MIN: CPT

## 2025-04-16 RX ORDER — SPIRONOLACTONE 50 MG/1
50 TABLET, FILM COATED ORAL 2 TIMES DAILY
Qty: 90 TABLET | Refills: 2 | Status: SHIPPED | OUTPATIENT
Start: 2025-04-16

## 2025-04-16 RX ORDER — METOPROLOL SUCCINATE 25 MG/1
25 TABLET, EXTENDED RELEASE ORAL DAILY
Qty: 90 TABLET | Refills: 2 | Status: SHIPPED | OUTPATIENT
Start: 2025-04-16

## 2025-04-16 RX ORDER — CANDESARTAN 4 MG/1
4 TABLET ORAL DAILY
Qty: 90 TABLET | Refills: 3 | Status: SHIPPED | OUTPATIENT
Start: 2025-04-16

## 2025-04-16 RX ORDER — ASPIRIN 81 MG/1
81 TABLET ORAL DAILY
Qty: 90 TABLET | Refills: 3 | Status: SHIPPED | OUTPATIENT
Start: 2025-04-16

## 2025-04-16 NOTE — PROGRESS NOTES
[facility]   [address]  [place] [address]  Dept: [phone]  Dept Fax: [phone]    Anna Bhakta (1970) is a 54 y.o. female Established patient, here for evaluation of the following chief complaint(s):  Chief Complaint   Patient presents with    Hypertension    Congestive Heart Failure    Obesity    Prediabetes        ASSESSMENT AND PLAN     1. Primary hypertension  -     candesartan (ATACAND) 4 MG tablet; Take 1 tablet by mouth daily, Disp-90 tablet, R-3Normal  2. Heart failure with mid-range ejection fraction, NYHA I (HCC)  -     metoprolol succinate (TOPROL XL) 25 MG extended release tablet; Take 1 tablet by mouth daily, Disp-90 tablet, R-2Normal  -     spironolactone (ALDACTONE) 50 MG tablet; Take 1 tablet by mouth 2 times daily, Disp-90 tablet, R-2Normal  3. Hyperglycemia  -     POCT glycosylated hemoglobin (Hb A1C)    Plan  Medications refilled including Toprol, Aldactone, and candesartan.  Continue to keep blood pressure log.  Obtain labs that were ordered 12/2024.  Follow-up in 6 months.  Call office earlier if any acute problems arise.    Return in about 6 months (around 10/16/2025). with Dhruv Yost DO     HPI     Chief Complaint   Patient presents with    Hypertension    Congestive Heart Failure    Obesity    Prediabetes       HPI  Pt Anna Bhakta is a 54 y.o. female with a PMH of HTN, HLD, HFmrEF who presented for a follow up on her blood pressure and medication refills.     Patient has been keeping a log of her blood pressures.  They have been ranging from 110s to 130s.  She has not been having any dizziness, lightheadedness, syncopal episodes, or headaches.  She is otherwise feeling very well and at her baseline.    A1c was checked at this office today and was 5.8.  She was supposed to obtain labs including BMP, TSH, and T4 that were ordered back in December 2024.  She has not yet obtained these.  Instructed her on the

## 2025-05-05 ENCOUNTER — HOSPITAL ENCOUNTER (OUTPATIENT)
Age: 55
Discharge: HOME | End: 2025-05-07
Attending: INTERNAL MEDICINE
Payer: COMMERCIAL

## 2025-05-05 VITALS
DIASTOLIC BLOOD PRESSURE: 68 MMHG | HEIGHT: 67 IN | WEIGHT: 226 LBS | BODY MASS INDEX: 35.47 KG/M2 | SYSTOLIC BLOOD PRESSURE: 139 MMHG

## 2025-05-05 DIAGNOSIS — R06.02 SHORTNESS OF BREATH: ICD-10-CM

## 2025-05-05 DIAGNOSIS — I42.9 CARDIOMYOPATHY, UNSPECIFIED TYPE (HCC): ICD-10-CM

## 2025-05-05 LAB
ECHO AO ASC DIAM: 3.3 CM
ECHO AO ASCENDING AORTA INDEX: 1.55 CM/M2
ECHO AV CUSP MM: 2 CM
ECHO AV PEAK GRADIENT: 8 MMHG
ECHO AV PEAK VELOCITY: 1.4 M/S
ECHO AV VELOCITY RATIO: 0.57
ECHO BSA: 2.2 M2
ECHO EST RA PRESSURE: 3 MMHG
ECHO LA AREA 2C: 17.6 CM2
ECHO LA AREA 4C: 19.2 CM2
ECHO LA DIAMETER INDEX: 1.55 CM/M2
ECHO LA DIAMETER: 3.3 CM
ECHO LA MAJOR AXIS: 5.5 CM
ECHO LA MINOR AXIS: 4.6 CM
ECHO LA VOL BP: 57 ML (ref 22–52)
ECHO LA VOL MOD A2C: 56 ML (ref 22–52)
ECHO LA VOL MOD A4C: 50 ML (ref 22–52)
ECHO LA VOL/BSA BIPLANE: 27 ML/M2 (ref 16–34)
ECHO LA VOLUME INDEX MOD A2C: 26 ML/M2 (ref 16–34)
ECHO LA VOLUME INDEX MOD A4C: 23 ML/M2 (ref 16–34)
ECHO LV E' LATERAL VELOCITY: 11.4 CM/S
ECHO LV E' SEPTAL VELOCITY: 6.3 CM/S
ECHO LV EDV A2C: 68 ML
ECHO LV EDV A4C: 122 ML
ECHO LV EDV INDEX A4C: 57 ML/M2
ECHO LV EDV NDEX A2C: 32 ML/M2
ECHO LV EF PHYSICIAN: 40 %
ECHO LV EJECTION FRACTION A2C: 32 %
ECHO LV EJECTION FRACTION A4C: 40 %
ECHO LV EJECTION FRACTION BIPLANE: 40 % (ref 55–100)
ECHO LV ESV A2C: 46 ML
ECHO LV ESV A4C: 74 ML
ECHO LV ESV INDEX A2C: 22 ML/M2
ECHO LV ESV INDEX A4C: 35 ML/M2
ECHO LV FRACTIONAL SHORTENING: 20 % (ref 28–44)
ECHO LV INTERNAL DIMENSION DIASTOLE INDEX: 2.35 CM/M2
ECHO LV INTERNAL DIMENSION DIASTOLIC: 5 CM (ref 3.9–5.3)
ECHO LV INTERNAL DIMENSION SYSTOLIC INDEX: 1.88 CM/M2
ECHO LV INTERNAL DIMENSION SYSTOLIC: 4 CM
ECHO LV ISOVOLUMETRIC RELAXATION TIME (IVRT): 88 MS
ECHO LV IVSD: 1.2 CM (ref 0.6–0.9)
ECHO LV MASS 2D: 194.4 G (ref 67–162)
ECHO LV MASS INDEX 2D: 91.3 G/M2 (ref 43–95)
ECHO LV POSTERIOR WALL DIASTOLIC: 0.9 CM (ref 0.6–0.9)
ECHO LV RELATIVE WALL THICKNESS RATIO: 0.36
ECHO LVOT PEAK GRADIENT: 2 MMHG
ECHO LVOT PEAK VELOCITY: 0.8 M/S
ECHO MV A VELOCITY: 0.85 M/S
ECHO MV E DECELERATION TIME (DT): 190 MS
ECHO MV E VELOCITY: 0.72 M/S
ECHO MV E/A RATIO: 0.85
ECHO MV E/E' LATERAL: 6.32
ECHO MV E/E' RATIO (AVERAGED): 8.87
ECHO MV E/E' SEPTAL: 11.43
ECHO MV REGURGITANT PEAK GRADIENT: 96 MMHG
ECHO MV REGURGITANT PEAK VELOCITY: 4.9 M/S
ECHO PULMONARY ARTERY END DIASTOLIC PRESSURE: 4 MMHG
ECHO PV MAX VELOCITY: 0.7 M/S
ECHO PV MAX VELOCITY: 0.9 M/S
ECHO PV PEAK GRADIENT: 2 MMHG
ECHO RIGHT VENTRICULAR SYSTOLIC PRESSURE (RVSP): 44 MMHG
ECHO RV INTERNAL DIMENSION: 1.8 CM
ECHO RV TAPSE: 2 CM (ref 1.7–?)
ECHO TV E WAVE: 0.6 M/S
ECHO TV REGURGITANT MAX VELOCITY: 3.21 M/S
ECHO TV REGURGITANT PEAK GRADIENT: 41 MMHG

## 2025-05-05 PROCEDURE — 93306 TTE W/DOPPLER COMPLETE: CPT | Performed by: INTERNAL MEDICINE

## 2025-05-05 PROCEDURE — 93306 TTE W/DOPPLER COMPLETE: CPT

## 2025-05-12 ENCOUNTER — OFFICE VISIT (OUTPATIENT)
Dept: CARDIOLOGY CLINIC | Age: 55
End: 2025-05-12
Payer: COMMERCIAL

## 2025-05-12 VITALS
HEART RATE: 85 BPM | BODY MASS INDEX: 33.83 KG/M2 | SYSTOLIC BLOOD PRESSURE: 143 MMHG | DIASTOLIC BLOOD PRESSURE: 78 MMHG | WEIGHT: 223.2 LBS | HEIGHT: 68 IN

## 2025-05-12 DIAGNOSIS — I42.9 CARDIOMYOPATHY, UNSPECIFIED TYPE (HCC): Primary | ICD-10-CM

## 2025-05-12 PROCEDURE — 99214 OFFICE O/P EST MOD 30 MIN: CPT | Performed by: INTERNAL MEDICINE

## 2025-05-12 PROCEDURE — 3077F SYST BP >= 140 MM HG: CPT | Performed by: INTERNAL MEDICINE

## 2025-05-12 PROCEDURE — 3078F DIAST BP <80 MM HG: CPT | Performed by: INTERNAL MEDICINE

## 2025-05-12 NOTE — PROGRESS NOTES
[facility] [place] [facility]  [facility] CARDIOLOGY  [address]  [address]  [facility] [address]  Dept: [phone]  Dept Fax: [phone]  Loc: [phone]    Visit Date: 5/12/2025    No chief complaint on file.      HPI:   Ms. Bhakta is a 54 y.o. female  who presented for:  History of Present Illness  The patient presents for evaluation of cardiomyopathy.    She was previously under the care of a cardiologist in [facility], where she was prescribed amiodarone. Upon arrival in the [place], a heart catheterization was performed, revealing no arterial blockages. The patient has a history of slightly weak heart muscle, with an ejection fraction of 40-45%, compared to the normal range of 50-55%. Blood pressure has been slightly elevated but is now well-controlled, with a reading of 128/70 this morning.     Her current medication regimen includes candesartan, metoprolol, spironolactone, and aspirin. Previous lab results from 04/16/2025 indicated a slightly elevated A1c level, while other parameters such as kidney function, electrolytes, and blood counts were within normal limits. An echocardiogram performed last week showed no improvement but also no worsening of her condition.      Current Outpatient Medications:     aspirin 81 MG EC tablet, Take 1 tablet by mouth daily, Disp: 90 tablet, Rfl: 3    metoprolol succinate (TOPROL XL) 25 MG extended release tablet, Take 1 tablet by mouth daily, Disp: 90 tablet, Rfl: 2    spironolactone (ALDACTONE) 50 MG tablet, Take 1 tablet by mouth 2 times daily, Disp: 90 tablet, Rfl: 2    candesartan (ATACAND) 4 MG tablet, Take 1 tablet by mouth daily, Disp: 90 tablet, Rfl: 3    ezetimibe (ZETIA) 10 MG tablet, Take 1 tablet by mouth daily, Disp: 90 tablet, Rfl: 3  Past Medical History   has a past medical history of Heart failure with reduced ejection fraction, NYHA class I (HCC), Hypertension, and Palpitations.    Social History  Anna  reports

## 2025-05-12 NOTE — PROGRESS NOTES
Pt C/O few headaches    Non smoker       Pt denies CP, SOB,dizziness, heart palpitations, swelling, fatigue

## 2025-07-16 NOTE — PROGRESS NOTES
[facility] [place] [facility]  [facility] CARDIOLOGY  [address]  [address]  [place] [facility] [address]  Dept: [phone]  Dept Fax: [phone]  Loc: [phone]    Visit Date: 2025    Anna Bhakta is a 55 y.o. female who presents todayfor:  Chief Complaint   Patient presents with    Palpitations     Cardiologist: Luke    Hx of HFmrEF 40-45%, HTN, pre-DM    HPI: f/u for palpitations  Having more flutters since not exercising more frequently lately. No cp or sob. No swelling or orthopnea. Does not sleep well however. Bp is lightly elevated. She has been stressed about daughter but recnetly had baby.   Past Surgical History:   Procedure Laterality Date    CARDIAC PROCEDURE N/A 2024    Left heart cath / coronary angiography performed by Marlin Redd MD at [facility] CARDIAC CATH LAB     SECTION      Has had twice     Family History   Problem Relation Age of Onset    High Blood Pressure Mother     High Blood Pressure Father     High Blood Pressure Sister     High Blood Pressure Brother      Social History     Tobacco Use    Smoking status: Never     Passive exposure: Never    Smokeless tobacco: Never   Substance Use Topics    Alcohol use: Not Currently     Alcohol/week: 1.0 standard drink of alcohol     Types: 1 Glasses of wine per week      Current Outpatient Medications   Medication Sig Dispense Refill    aspirin 81 MG EC tablet Take 1 tablet by mouth daily 90 tablet 3    metoprolol succinate (TOPROL XL) 25 MG extended release tablet Take 1 tablet by mouth daily 90 tablet 2    spironolactone (ALDACTONE) 50 MG tablet Take 1 tablet by mouth 2 times daily 90 tablet 2    candesartan (ATACAND) 4 MG tablet Take 1 tablet by mouth daily 90 tablet 3    ezetimibe (ZETIA) 10 MG tablet Take 1 tablet by mouth daily 90 tablet 3     No current facility-administered medications for this visit.     Allergies   Allergen Reactions    Dust Mite Extract      Health Maintenance   Topic Date Due

## 2025-07-17 ENCOUNTER — OFFICE VISIT (OUTPATIENT)
Dept: CARDIOLOGY CLINIC | Age: 55
End: 2025-07-17
Payer: COMMERCIAL

## 2025-07-17 VITALS
WEIGHT: 223 LBS | DIASTOLIC BLOOD PRESSURE: 87 MMHG | HEART RATE: 78 BPM | BODY MASS INDEX: 33.8 KG/M2 | SYSTOLIC BLOOD PRESSURE: 151 MMHG | HEIGHT: 68 IN

## 2025-07-17 DIAGNOSIS — I50.22 HEART FAILURE WITH MID-RANGE EJECTION FRACTION (HCC): ICD-10-CM

## 2025-07-17 DIAGNOSIS — I50.22 SYSTOLIC CHF, CHRONIC (HCC): ICD-10-CM

## 2025-07-17 DIAGNOSIS — I10 PRIMARY HYPERTENSION: ICD-10-CM

## 2025-07-17 DIAGNOSIS — I49.3 VENTRICULAR PREMATURE DEPOLARIZATION: ICD-10-CM

## 2025-07-17 DIAGNOSIS — R00.2 PALPITATIONS: Primary | ICD-10-CM

## 2025-07-17 PROCEDURE — 99214 OFFICE O/P EST MOD 30 MIN: CPT | Performed by: STUDENT IN AN ORGANIZED HEALTH CARE EDUCATION/TRAINING PROGRAM

## 2025-07-17 PROCEDURE — 3077F SYST BP >= 140 MM HG: CPT | Performed by: STUDENT IN AN ORGANIZED HEALTH CARE EDUCATION/TRAINING PROGRAM

## 2025-07-17 PROCEDURE — 3079F DIAST BP 80-89 MM HG: CPT | Performed by: STUDENT IN AN ORGANIZED HEALTH CARE EDUCATION/TRAINING PROGRAM

## 2025-07-24 ENCOUNTER — HOSPITAL ENCOUNTER (OUTPATIENT)
Age: 55
Discharge: HOME | End: 2025-07-26
Payer: COMMERCIAL

## 2025-07-24 DIAGNOSIS — I49.3 VENTRICULAR PREMATURE DEPOLARIZATION: ICD-10-CM

## 2025-07-24 PROCEDURE — 93242 EXT ECG>48HR<7D RECORDING: CPT

## 2025-08-04 ENCOUNTER — RESULTS FOLLOW-UP (OUTPATIENT)
Dept: CARDIOLOGY | Age: 55
End: 2025-08-04

## (undated) DEVICE — GUIDEWIRE VASC L260CM DIA0.035IN RAD 3MM J TIP L7CM PTFE

## (undated) DEVICE — DRAPE KIT RAMAPR RADIATION SHIELD

## (undated) DEVICE — TIBURON NEONATAL DRAPE: Brand: CONVERTORS

## (undated) DEVICE — GLIDESHEATH SLENDER ACCESS KIT: Brand: GLIDESHEATH SLENDER

## (undated) DEVICE — CATHETER ANGIO 5FR L100CM GRY S STL NYL JR4 3 SEG BRAID L

## (undated) DEVICE — BAND COMPR L24CM REG CLR PLAS HEMSTAT EXT HK AND LOOP RETEN

## (undated) DEVICE — CATHETER 5FR JL4 CORDIS 100 CM

## (undated) DEVICE — CATHETER GUID 6FR L100CM DIA0.071IN NYL SHFT EBU3.5

## (undated) DEVICE — KIT ANGIO W/ AT P65 PREM HND CTRL FOR CNTRST DEL ANGIOTOUCH

## (undated) DEVICE — VALVE HEMSTAS W/ GWIRE INSRTN TOOL GRDIAN II NC

## (undated) DEVICE — KIT MFLD ISOLATN NACL CNTRST PRT TBNG SPIK W/ PRSS TRNSDUC

## (undated) DEVICE — CATHETER 5FR JL3.5 CORDIS 100CM

## (undated) DEVICE — Device